# Patient Record
Sex: MALE | URBAN - METROPOLITAN AREA
[De-identification: names, ages, dates, MRNs, and addresses within clinical notes are randomized per-mention and may not be internally consistent; named-entity substitution may affect disease eponyms.]

---

## 2018-04-19 ENCOUNTER — APPOINTMENT (RX ONLY)
Age: 33
Setting detail: DERMATOLOGY
End: 2018-04-19

## 2018-04-19 DIAGNOSIS — L40.0 PSORIASIS VULGARIS: ICD-10-CM

## 2018-04-19 PROCEDURE — ? PATIENT SPECIFIC COUNSELING

## 2018-04-19 PROCEDURE — ? COUNSELING

## 2018-04-19 PROCEDURE — 99202 OFFICE O/P NEW SF 15 MIN: CPT

## 2018-04-19 PROCEDURE — ? PRESCRIPTION

## 2018-04-19 RX ORDER — PIMECROLIMUS 10 MG/G
CREAM TOPICAL
Qty: 1 | Refills: 5 | Status: ERX | COMMUNITY
Start: 2018-04-19

## 2018-04-19 RX ORDER — CALCIPOTRIENE AND BETAMETHASONE DIPROPIONATE 50; .5 UG/G; MG/G
SUSPENSION TOPICAL
Qty: 1 | Refills: 5 | Status: ERX | COMMUNITY
Start: 2018-04-19

## 2018-04-19 RX ADMIN — CALCIPOTRIENE AND BETAMETHASONE DIPROPIONATE: 50; .5 SUSPENSION TOPICAL at 20:05

## 2018-04-19 RX ADMIN — PIMECROLIMUS: 10 CREAM TOPICAL at 20:06

## 2018-04-19 ASSESSMENT — LOCATION SIMPLE DESCRIPTION DERM
LOCATION SIMPLE: LEFT FOREHEAD
LOCATION SIMPLE: ABDOMEN
LOCATION SIMPLE: LEFT KNEE
LOCATION SIMPLE: LOWER BACK
LOCATION SIMPLE: RIGHT KNEE

## 2018-04-19 ASSESSMENT — LOCATION ZONE DERM
LOCATION ZONE: TRUNK
LOCATION ZONE: LEG
LOCATION ZONE: FACE

## 2018-04-19 ASSESSMENT — BSA PSORIASIS: % BODY COVERED IN PSORIASIS: 16

## 2018-04-19 ASSESSMENT — LOCATION DETAILED DESCRIPTION DERM
LOCATION DETAILED: LEFT KNEE
LOCATION DETAILED: INFERIOR LUMBAR SPINE
LOCATION DETAILED: PERIUMBILICAL SKIN
LOCATION DETAILED: LEFT FOREHEAD
LOCATION DETAILED: RIGHT KNEE

## 2018-04-19 ASSESSMENT — PGA PSORIASIS: PGA PSORIASIS: MODERATE (MODERATE PLAQUE ELEVATION, MODERATE ERYTHEMA, COARSE SCALE PREDOMINATES)

## 2018-04-19 NOTE — PROCEDURE: PATIENT SPECIFIC COUNSELING
Eat more veggies! Exercise! Letter sent to PCP. \\nElidel is a non-steroidal, anti-inflammatory drug without the side effects of rebound and permanent skin-thinning to be used in the groin. \\nI recommended excimer laser treatment along with topicals and we will work on getting approval for him.
Detail Level: Simple

## 2018-05-15 ENCOUNTER — APPOINTMENT (RX ONLY)
Age: 33
Setting detail: DERMATOLOGY
End: 2018-05-15

## 2018-05-15 DIAGNOSIS — L40.0 PSORIASIS VULGARIS: ICD-10-CM

## 2018-05-15 PROCEDURE — 96922 EXCIMER LSR PSRIASIS>500SQCM: CPT

## 2018-05-15 PROCEDURE — ? XTRAC LASER

## 2018-05-15 ASSESSMENT — LOCATION DETAILED DESCRIPTION DERM: LOCATION DETAILED: RIGHT SUPERIOR MEDIAL UPPER BACK

## 2018-05-15 ASSESSMENT — LOCATION SIMPLE DESCRIPTION DERM: LOCATION SIMPLE: RIGHT UPPER BACK

## 2018-05-15 ASSESSMENT — LOCATION ZONE DERM: LOCATION ZONE: TRUNK

## 2018-05-15 NOTE — PROCEDURE: XTRAC LASER
Dose Setting #1 (Mj/Cm2): 500
Consent: see scanned.
Detail Level: Generalized
Post-Care Instructions: I reviewed with the patient in detail post-care instructions. Patient should stay away from the sun and wear sun protection until treated areas are fully healed.
Total Square Area In Cm2 (Required For Proper Billing): 571
Treatment Number: 1
Total Pulses (Will Not Render If 0): 0
Location #1: feet, legs, arms, back, scalp, hands
Total Energy In Joules: 286.00

## 2018-05-17 ENCOUNTER — APPOINTMENT (RX ONLY)
Age: 33
Setting detail: DERMATOLOGY
End: 2018-05-17

## 2018-05-17 DIAGNOSIS — L40.0 PSORIASIS VULGARIS: ICD-10-CM

## 2018-05-17 PROCEDURE — ? XTRAC LASER

## 2018-05-17 PROCEDURE — 96922 EXCIMER LSR PSRIASIS>500SQCM: CPT

## 2018-05-17 ASSESSMENT — LOCATION DETAILED DESCRIPTION DERM: LOCATION DETAILED: RIGHT SUPERIOR MEDIAL UPPER BACK

## 2018-05-17 ASSESSMENT — LOCATION ZONE DERM: LOCATION ZONE: TRUNK

## 2018-05-17 ASSESSMENT — LOCATION SIMPLE DESCRIPTION DERM: LOCATION SIMPLE: RIGHT UPPER BACK

## 2018-05-17 NOTE — PROCEDURE: XTRAC LASER
Location #1: feet, legs, arms, back, scalp, hands
Detail Level: Generalized
Total Square Area In Cm2 (Required For Proper Billing): 504
Consent: see scanned.
Post-Care Instructions: I reviewed with the patient in detail post-care instructions. Patient should stay away from the sun and wear sun protection until treated areas are fully healed.
Total Energy In Joules: 289.80
Dose Setting #1 (Mj/Cm2): 571
Total Pulses (Will Not Render If 0): 0
Treatment Number: 2

## 2018-05-22 ENCOUNTER — APPOINTMENT (RX ONLY)
Age: 33
Setting detail: DERMATOLOGY
End: 2018-05-22

## 2018-05-22 DIAGNOSIS — L40.0 PSORIASIS VULGARIS: ICD-10-CM

## 2018-05-22 PROCEDURE — ? XTRAC LASER

## 2018-05-22 PROCEDURE — 96922 EXCIMER LSR PSRIASIS>500SQCM: CPT

## 2018-05-22 ASSESSMENT — LOCATION SIMPLE DESCRIPTION DERM: LOCATION SIMPLE: RIGHT UPPER BACK

## 2018-05-22 ASSESSMENT — LOCATION DETAILED DESCRIPTION DERM: LOCATION DETAILED: RIGHT SUPERIOR MEDIAL UPPER BACK

## 2018-05-22 ASSESSMENT — LOCATION ZONE DERM: LOCATION ZONE: TRUNK

## 2018-05-22 NOTE — PROCEDURE: XTRAC LASER
Treatment Number: 3
Total Energy In Joules: 308.20
Location #1: feet, legs, arms, back, scalp, hands
Post-Care Instructions: I reviewed with the patient in detail post-care instructions. Patient should stay away from the sun and wear sun protection until treated areas are fully healed.
Total Square Area In Cm2 (Required For Proper Billing): 53
Total Pulses (Will Not Render If 0): 0
Consent: see scanned.
Detail Level: Generalized
Dose Setting #1 (Mj/Cm2): 576

## 2018-05-24 ENCOUNTER — APPOINTMENT (RX ONLY)
Age: 33
Setting detail: DERMATOLOGY
End: 2018-05-24

## 2018-05-24 DIAGNOSIS — L40.0 PSORIASIS VULGARIS: ICD-10-CM

## 2018-05-24 PROCEDURE — 96921 EXCIMER LSR PSRIASIS 250-500: CPT

## 2018-05-24 PROCEDURE — ? XTRAC LASER

## 2018-05-24 ASSESSMENT — LOCATION ZONE DERM: LOCATION ZONE: TRUNK

## 2018-05-24 ASSESSMENT — LOCATION SIMPLE DESCRIPTION DERM: LOCATION SIMPLE: RIGHT UPPER BACK

## 2018-05-24 ASSESSMENT — LOCATION DETAILED DESCRIPTION DERM: LOCATION DETAILED: RIGHT SUPERIOR MEDIAL UPPER BACK

## 2018-05-24 NOTE — PROCEDURE: XTRAC LASER
Location #3: scalp
Dose Setting #2 (Mj/Cm2): 577
Treatment Number: 4
Dose Setting #3 (Mj/Cm2): 690
Location #1: feet, legs, arms, back, hands
Post-Care Instructions: I reviewed with the patient in detail post-care instructions. Patient should stay away from the sun and wear sun protection until treated areas are fully healed.
Consent: see scanned.
Location #2: chest, axillae
Total Energy In Joules: 321.48
Detail Level: Generalized
Total Square Area In Cm2 (Required For Proper Billing): 480
Dose Setting #1 (Mj/Cm2): 660
Total Pulses (Will Not Render If 0): 0

## 2018-05-29 ENCOUNTER — APPOINTMENT (RX ONLY)
Age: 33
Setting detail: DERMATOLOGY
End: 2018-05-29

## 2018-05-29 DIAGNOSIS — L40.0 PSORIASIS VULGARIS: ICD-10-CM

## 2018-05-29 PROCEDURE — 96921 EXCIMER LSR PSRIASIS 250-500: CPT

## 2018-05-29 PROCEDURE — ? XTRAC LASER

## 2018-05-29 ASSESSMENT — LOCATION SIMPLE DESCRIPTION DERM: LOCATION SIMPLE: RIGHT UPPER BACK

## 2018-05-29 ASSESSMENT — LOCATION DETAILED DESCRIPTION DERM: LOCATION DETAILED: RIGHT SUPERIOR MEDIAL UPPER BACK

## 2018-05-29 ASSESSMENT — LOCATION ZONE DERM: LOCATION ZONE: TRUNK

## 2018-05-29 NOTE — PROCEDURE: XTRAC LASER
Dose Setting #1 (Mj/Cm2): 378
Dose Setting #3 (Mj/Cm2): 790
Total Square Area In Cm2 (Required For Proper Billing): 061
Location #1: feet, legs, arms, back, hands
Total Pulses (Will Not Render If 0): 0
Treatment Number: 5
Consent: see scanned.
Location #2: chest, axillae
Dose Setting #2 (Mj/Cm2): 57
Total Energy In Joules: 332.06
Post-Care Instructions: I reviewed with the patient in detail post-care instructions. Patient should stay away from the sun and wear sun protection until treated areas are fully healed.
Location #3: scalp
Detail Level: Generalized

## 2018-06-12 ENCOUNTER — APPOINTMENT (RX ONLY)
Age: 33
Setting detail: DERMATOLOGY
End: 2018-06-12

## 2018-06-12 DIAGNOSIS — L40.0 PSORIASIS VULGARIS: ICD-10-CM

## 2018-06-12 PROCEDURE — ? XTRAC LASER

## 2018-06-12 PROCEDURE — 96921 EXCIMER LSR PSRIASIS 250-500: CPT

## 2018-06-12 ASSESSMENT — LOCATION ZONE DERM: LOCATION ZONE: TRUNK

## 2018-06-12 ASSESSMENT — LOCATION SIMPLE DESCRIPTION DERM: LOCATION SIMPLE: RIGHT UPPER BACK

## 2018-06-12 ASSESSMENT — LOCATION DETAILED DESCRIPTION DERM: LOCATION DETAILED: RIGHT SUPERIOR MEDIAL UPPER BACK

## 2018-08-01 ENCOUNTER — APPOINTMENT (RX ONLY)
Age: 33
Setting detail: DERMATOLOGY
End: 2018-08-01

## 2018-08-01 DIAGNOSIS — L40.0 PSORIASIS VULGARIS: ICD-10-CM

## 2018-08-01 PROCEDURE — ? XTRAC LASER

## 2018-08-01 PROCEDURE — 96922 EXCIMER LSR PSRIASIS>500SQCM: CPT

## 2018-08-01 ASSESSMENT — LOCATION ZONE DERM: LOCATION ZONE: TRUNK

## 2018-08-01 ASSESSMENT — LOCATION SIMPLE DESCRIPTION DERM: LOCATION SIMPLE: RIGHT UPPER BACK

## 2018-08-01 ASSESSMENT — LOCATION DETAILED DESCRIPTION DERM: LOCATION DETAILED: RIGHT SUPERIOR MEDIAL UPPER BACK

## 2018-08-01 NOTE — PROCEDURE: XTRAC LASER
Dose Setting #2 (Mj/Cm2): 0
Post-Care Instructions: I reviewed with the patient in detail post-care instructions. Patient should stay away from the sun and wear sun protection until treated areas are fully healed.
Location #3: scalp
Treatment Number: 7
Total Square Area In Cm2 (Required For Proper Billing): 540
Dose Setting #1 (Mj/Cm2): 036
Detail Level: Generalized
Location #2: axillae
Consent: see scanned.
Dose Setting #3 (Mj/Cm2): 79
Total Energy In Joules: 420.30
Location #1: feet, legs, arms, back, hands, buttocks, toenails, nails, chest

## 2018-08-07 ENCOUNTER — APPOINTMENT (RX ONLY)
Age: 33
Setting detail: DERMATOLOGY
End: 2018-08-07

## 2018-08-07 DIAGNOSIS — L40.0 PSORIASIS VULGARIS: ICD-10-CM

## 2018-08-07 PROCEDURE — ? XTRAC LASER

## 2018-08-07 PROCEDURE — 96922 EXCIMER LSR PSRIASIS>500SQCM: CPT

## 2018-08-07 ASSESSMENT — LOCATION SIMPLE DESCRIPTION DERM: LOCATION SIMPLE: RIGHT UPPER BACK

## 2018-08-07 ASSESSMENT — LOCATION ZONE DERM: LOCATION ZONE: TRUNK

## 2018-08-07 ASSESSMENT — LOCATION DETAILED DESCRIPTION DERM: LOCATION DETAILED: RIGHT SUPERIOR MEDIAL UPPER BACK

## 2018-08-07 NOTE — PROCEDURE: XTRAC LASER
Total Pulses (Will Not Render If 0): 0
Post-Care Instructions: I reviewed with the patient in detail post-care instructions. Patient should stay away from the sun and wear sun protection until treated areas are fully healed.
Location #3: scalp
Total Square Area In Cm2 (Required For Proper Billing): 548
Consent: see scanned.
Dose Setting #3 (Mj/Cm2): 917
Treatment Number: 8
Location #2: axillae
Dose Setting #1 (Mj/Cm2): 760
Total Energy In Joules: 441.22
Location #1: feet, legs, arms, back, hands, buttocks, toenails, nails, chest
Detail Level: Generalized

## 2018-08-10 ENCOUNTER — APPOINTMENT (RX ONLY)
Age: 33
Setting detail: DERMATOLOGY
End: 2018-08-10

## 2018-08-10 DIAGNOSIS — L40.0 PSORIASIS VULGARIS: ICD-10-CM

## 2018-08-10 PROCEDURE — ? XTRAC LASER

## 2018-08-10 PROCEDURE — 96921 EXCIMER LSR PSRIASIS 250-500: CPT

## 2018-08-10 ASSESSMENT — LOCATION SIMPLE DESCRIPTION DERM: LOCATION SIMPLE: RIGHT UPPER BACK

## 2018-08-10 ASSESSMENT — LOCATION DETAILED DESCRIPTION DERM: LOCATION DETAILED: RIGHT SUPERIOR MEDIAL UPPER BACK

## 2018-08-10 ASSESSMENT — LOCATION ZONE DERM: LOCATION ZONE: TRUNK

## 2018-08-10 NOTE — PROCEDURE: XTRAC LASER
Dose Setting #1 (Mj/Cm2): 760
Detail Level: Generalized
Total Square Area In Cm2 (Required For Proper Billing): 416
Treatment Number: 9
Location #2: axillae
Consent: see scanned.
Total Energy In Joules: 316.16
Location #1: feet, legs, arms, back, hands, buttocks, toenails, nails, chest
Total Pulses (Will Not Render If 0): 0
Post-Care Instructions: I reviewed with the patient in detail post-care instructions. Patient should stay away from the sun and wear sun protection until treated areas are fully healed.
Location #3: scalp

## 2018-08-28 ENCOUNTER — APPOINTMENT (RX ONLY)
Age: 33
Setting detail: DERMATOLOGY
End: 2018-08-28

## 2018-08-28 ENCOUNTER — RX ONLY (OUTPATIENT)
Age: 33
Setting detail: RX ONLY
End: 2018-08-28

## 2018-08-28 DIAGNOSIS — L40.0 PSORIASIS VULGARIS: ICD-10-CM

## 2018-08-28 PROCEDURE — ? XTRAC LASER

## 2018-08-28 PROCEDURE — 96922 EXCIMER LSR PSRIASIS>500SQCM: CPT

## 2018-08-28 RX ORDER — CALCIPOTRIENE AND BETAMETHASONE DIPROPIONATE 50; .5 UG/G; MG/G
SUSPENSION TOPICAL
Qty: 1 | Refills: 5 | Status: ERX

## 2018-08-28 RX ORDER — DESONIDE 0.5 MG/G
CREAM TOPICAL
Qty: 1 | Refills: 2 | Status: ERX | COMMUNITY
Start: 2018-08-28

## 2018-08-28 ASSESSMENT — LOCATION SIMPLE DESCRIPTION DERM: LOCATION SIMPLE: RIGHT UPPER BACK

## 2018-08-28 ASSESSMENT — LOCATION ZONE DERM: LOCATION ZONE: TRUNK

## 2018-08-28 ASSESSMENT — LOCATION DETAILED DESCRIPTION DERM: LOCATION DETAILED: RIGHT SUPERIOR MEDIAL UPPER BACK

## 2018-08-28 NOTE — PROCEDURE: XTRAC LASER
Post-Care Instructions: I reviewed with the patient in detail post-care instructions. Patient should stay away from the sun and wear sun protection until treated areas are fully healed.
Dose Settinge #4 (Mj/Cm2): 900
Total Energy In Joules: 474.11
Location #1: feet, legs, arms, back, hands, buttocks, toenails, nails, chest
Location #4: nails, legs, gluteus
Location #2: axillae
Treatment Number: 10
Total Square Area In Cm2 (Required For Proper Billing): 573
Dose Setting #2 (Mj/Cm2): 500
Dose Setting #3 (Mj/Cm2): 760
Total Pulses (Will Not Render If 0): 0
Consent: see scanned.
Location #3: scalp
Detail Level: Generalized

## 2018-08-30 ENCOUNTER — APPOINTMENT (RX ONLY)
Age: 33
Setting detail: DERMATOLOGY
End: 2018-08-30

## 2018-08-30 DIAGNOSIS — L40.0 PSORIASIS VULGARIS: ICD-10-CM

## 2018-08-30 PROCEDURE — 96922 EXCIMER LSR PSRIASIS>500SQCM: CPT

## 2018-08-30 PROCEDURE — ? XTRAC LASER

## 2018-08-30 ASSESSMENT — LOCATION SIMPLE DESCRIPTION DERM: LOCATION SIMPLE: RIGHT UPPER BACK

## 2018-08-30 ASSESSMENT — LOCATION DETAILED DESCRIPTION DERM: LOCATION DETAILED: RIGHT SUPERIOR MEDIAL UPPER BACK

## 2018-08-30 ASSESSMENT — LOCATION ZONE DERM: LOCATION ZONE: TRUNK

## 2018-08-30 NOTE — PROCEDURE: XTRAC LASER
Location #2: axillae
Detail Level: Generalized
Total Pulses (Will Not Render If 0): 0
Treatment Number: 11
Dose Setting #2 (Mj/Cm2): 500
Dose Setting #3 (Mj/Cm2): 1005
Consent: see scanned.
Post-Care Instructions: I reviewed with the patient in detail post-care instructions. Patient should stay away from the sun and wear sun protection until treated areas are fully healed.
Location #1: chest, scalp, back
Total Energy In Joules: 471.93
Dose Setting #1 (Mj/Cm2): 632
Total Square Area In Cm2 (Required For Proper Billing): 000
Location #3: legs, gluteal cleft, nails

## 2018-09-06 ENCOUNTER — APPOINTMENT (RX ONLY)
Age: 33
Setting detail: DERMATOLOGY
End: 2018-09-06

## 2018-09-06 DIAGNOSIS — L40.0 PSORIASIS VULGARIS: ICD-10-CM

## 2018-09-06 PROCEDURE — ? XTRAC LASER

## 2018-09-06 PROCEDURE — 96921 EXCIMER LSR PSRIASIS 250-500: CPT

## 2018-09-06 ASSESSMENT — LOCATION DETAILED DESCRIPTION DERM: LOCATION DETAILED: RIGHT SUPERIOR MEDIAL UPPER BACK

## 2018-09-06 ASSESSMENT — LOCATION SIMPLE DESCRIPTION DERM: LOCATION SIMPLE: RIGHT UPPER BACK

## 2018-09-06 ASSESSMENT — LOCATION ZONE DERM: LOCATION ZONE: TRUNK

## 2018-09-06 NOTE — PROCEDURE: XTRAC LASER
Location #2: axillae
Dose Setting #3 (Mj/Cm2): 1005
Consent: see scanned.
Location #3: legs, gluteal cleft, nails
Post-Care Instructions: I reviewed with the patient in detail post-care instructions. Patient should stay away from the sun and wear sun protection until treated areas are fully healed.
Dose Setting #2 (Mj/Cm2): 500
Treatment Number: 12
Dose Setting #1 (Mj/Cm2): 630
Total Square Area In Cm2 (Required For Proper Billing): 486
Total Pulses (Will Not Render If 0): 0
Detail Level: Generalized
Total Energy In Joules: 403.99
Location #1: chest, scalp, back

## 2018-09-11 ENCOUNTER — APPOINTMENT (RX ONLY)
Age: 33
Setting detail: DERMATOLOGY
End: 2018-09-11

## 2018-09-11 DIAGNOSIS — L40.0 PSORIASIS VULGARIS: ICD-10-CM

## 2018-09-11 PROCEDURE — 96922 EXCIMER LSR PSRIASIS>500SQCM: CPT

## 2018-09-11 PROCEDURE — ? XTRAC LASER

## 2018-09-11 ASSESSMENT — LOCATION DETAILED DESCRIPTION DERM: LOCATION DETAILED: RIGHT SUPERIOR MEDIAL UPPER BACK

## 2018-09-11 ASSESSMENT — LOCATION SIMPLE DESCRIPTION DERM: LOCATION SIMPLE: RIGHT UPPER BACK

## 2018-09-11 ASSESSMENT — LOCATION ZONE DERM: LOCATION ZONE: TRUNK

## 2018-09-11 NOTE — PROCEDURE: XTRAC LASER
Location #1: chest, scalp, back
Total Square Area In Cm2 (Required For Proper Billing): 570
Consent: see scanned.
Dose Setting #1 (Mj/Cm2): 732
Location #3: nails, knees
Detail Level: Generalized
Dose Setting #2 (Mj/Cm2): 350
Treatment Number: 13
Total Energy In Joules: 439.27
Location #2: axillae, thighs
Dose Setting #3 (Mj/Cm2): 1005
Post-Care Instructions: I reviewed with the patient in detail post-care instructions. Patient should stay away from the sun and wear sun protection until treated areas are fully healed.
Total Pulses (Will Not Render If 0): 0

## 2018-09-17 ENCOUNTER — APPOINTMENT (RX ONLY)
Age: 33
Setting detail: DERMATOLOGY
End: 2018-09-17

## 2018-09-17 DIAGNOSIS — L40.0 PSORIASIS VULGARIS: ICD-10-CM

## 2018-09-17 PROCEDURE — ? XTRAC LASER

## 2018-09-17 PROCEDURE — 96922 EXCIMER LSR PSRIASIS>500SQCM: CPT

## 2018-09-17 ASSESSMENT — LOCATION SIMPLE DESCRIPTION DERM: LOCATION SIMPLE: RIGHT UPPER BACK

## 2018-09-17 ASSESSMENT — LOCATION ZONE DERM: LOCATION ZONE: TRUNK

## 2018-09-17 ASSESSMENT — LOCATION DETAILED DESCRIPTION DERM: LOCATION DETAILED: RIGHT SUPERIOR MEDIAL UPPER BACK

## 2018-09-17 NOTE — PROCEDURE: XTRAC LASER
Location #1: chest, scalp, back
Treatment Number: 14
Location #3: nails, toenails
Total Energy In Joules: 412.65
Detail Level: Generalized
Location #2: axillae, thighs
Total Square Area In Cm2 (Required For Proper Billing): 580
Total Pulses (Will Not Render If 0): 0
Dose Setting #3 (Mj/Cm2): 3966
Dose Setting #1 (Mj/Cm2): 732
Consent: see scanned.
Dose Setting #2 (Mj/Cm2): 350
Post-Care Instructions: I reviewed with the patient in detail post-care instructions. Patient should stay away from the sun and wear sun protection until treated areas are fully healed.

## 2018-09-25 ENCOUNTER — APPOINTMENT (RX ONLY)
Age: 33
Setting detail: DERMATOLOGY
End: 2018-09-25

## 2018-09-25 DIAGNOSIS — L40.0 PSORIASIS VULGARIS: ICD-10-CM

## 2018-09-25 PROCEDURE — 96922 EXCIMER LSR PSRIASIS>500SQCM: CPT

## 2018-09-25 PROCEDURE — ? XTRAC LASER

## 2018-09-25 ASSESSMENT — LOCATION SIMPLE DESCRIPTION DERM: LOCATION SIMPLE: RIGHT UPPER BACK

## 2018-09-25 ASSESSMENT — LOCATION ZONE DERM: LOCATION ZONE: TRUNK

## 2018-09-25 ASSESSMENT — LOCATION DETAILED DESCRIPTION DERM: LOCATION DETAILED: RIGHT SUPERIOR MEDIAL UPPER BACK

## 2018-09-25 NOTE — PROCEDURE: XTRAC LASER
Location #3: nails, toenails
Dose Setting #3 (Mj/Cm2): 3574
Treatment Number: 15
Detail Level: Generalized
Location #2: axillae, thighs
Post-Care Instructions: I reviewed with the patient in detail post-care instructions. Patient should stay away from the sun and wear sun protection until treated areas are fully healed.
Total Pulses (Will Not Render If 0): 0
Dose Setting #1 (Mj/Cm2): 732
Total Energy In Joules: 529.14
Total Square Area In Cm2 (Required For Proper Billing): 216
Location #1: chest, scalp, back
Consent: see scanned.
Dose Setting #2 (Mj/Cm2): 350

## 2018-09-27 ENCOUNTER — APPOINTMENT (RX ONLY)
Age: 33
Setting detail: DERMATOLOGY
End: 2018-09-27

## 2018-09-27 ENCOUNTER — RX ONLY (OUTPATIENT)
Age: 33
Setting detail: RX ONLY
End: 2018-09-27

## 2018-09-27 DIAGNOSIS — L40.0 PSORIASIS VULGARIS: ICD-10-CM

## 2018-09-27 PROCEDURE — ? XTRAC LASER

## 2018-09-27 PROCEDURE — 96922 EXCIMER LSR PSRIASIS>500SQCM: CPT

## 2018-09-27 RX ORDER — CLOBETASOL PROPIONATE 0.5 MG/G
OINTMENT TOPICAL
Qty: 1 | Refills: 2 | Status: ERX | COMMUNITY
Start: 2018-09-27

## 2018-09-27 ASSESSMENT — LOCATION ZONE DERM: LOCATION ZONE: TRUNK

## 2018-09-27 ASSESSMENT — LOCATION DETAILED DESCRIPTION DERM: LOCATION DETAILED: RIGHT SUPERIOR MEDIAL UPPER BACK

## 2018-09-27 ASSESSMENT — LOCATION SIMPLE DESCRIPTION DERM: LOCATION SIMPLE: RIGHT UPPER BACK

## 2018-09-27 NOTE — PROCEDURE: XTRAC LASER
Consent: see scanned.
Dose Setting #3 (Mj/Cm2): 2892
Dose Setting #1 (Mj/Cm2): 732
Detail Level: Generalized
Dose Setting #2 (Mj/Cm2): 350
Location #1: chest, scalp, back
Location #3: nails, toenails
Location #2: axillae, thighs
Total Square Area In Cm2 (Required For Proper Billing): 845
Treatment Number: 16
Post-Care Instructions: I reviewed with the patient in detail post-care instructions. Patient should stay away from the sun and wear sun protection until treated areas are fully healed.
Total Energy In Joules: 461.42
Total Pulses (Will Not Render If 0): 0

## 2018-10-02 ENCOUNTER — APPOINTMENT (RX ONLY)
Age: 33
Setting detail: DERMATOLOGY
End: 2018-10-02

## 2018-10-02 DIAGNOSIS — L40.0 PSORIASIS VULGARIS: ICD-10-CM

## 2018-10-02 PROCEDURE — ? XTRAC LASER

## 2018-10-02 PROCEDURE — 96922 EXCIMER LSR PSRIASIS>500SQCM: CPT

## 2018-10-02 ASSESSMENT — LOCATION DETAILED DESCRIPTION DERM: LOCATION DETAILED: RIGHT SUPERIOR MEDIAL UPPER BACK

## 2018-10-02 ASSESSMENT — LOCATION SIMPLE DESCRIPTION DERM: LOCATION SIMPLE: RIGHT UPPER BACK

## 2018-10-02 ASSESSMENT — LOCATION ZONE DERM: LOCATION ZONE: TRUNK

## 2018-10-02 NOTE — PROCEDURE: XTRAC LASER
Total Pulses (Will Not Render If 0): 0
Dose Setting #1 (Mj/Cm2): 805
Treatment Number: 17
Dose Setting #3 (Mj/Cm2): 5481
Location #2: axillae, thighs
Location #1: chest, scalp, back
Detail Level: Generalized
Dose Setting #2 (Mj/Cm2): 385
Total Square Area In Cm2 (Required For Proper Billing): 680
Consent: see scanned.
Total Energy In Joules: 559.64
Post-Care Instructions: I reviewed with the patient in detail post-care instructions. Patient should stay away from the sun and wear sun protection until treated areas are fully healed.
Location #3: nails, toenails

## 2018-10-04 ENCOUNTER — APPOINTMENT (RX ONLY)
Age: 33
Setting detail: DERMATOLOGY
End: 2018-10-04

## 2018-10-04 DIAGNOSIS — L40.0 PSORIASIS VULGARIS: ICD-10-CM

## 2018-10-04 PROCEDURE — 96922 EXCIMER LSR PSRIASIS>500SQCM: CPT

## 2018-10-04 PROCEDURE — ? XTRAC LASER

## 2018-10-04 ASSESSMENT — LOCATION DETAILED DESCRIPTION DERM: LOCATION DETAILED: RIGHT SUPERIOR MEDIAL UPPER BACK

## 2018-10-04 ASSESSMENT — LOCATION ZONE DERM: LOCATION ZONE: TRUNK

## 2018-10-04 ASSESSMENT — LOCATION SIMPLE DESCRIPTION DERM: LOCATION SIMPLE: RIGHT UPPER BACK

## 2018-10-04 NOTE — PROCEDURE: XTRAC LASER
Dose Setting #2 (Mj/Cm2): 385
Location #1: chest, scalp, back, knees
Total Square Area In Cm2 (Required For Proper Billing): 580
Detail Level: Generalized
Dose Setting #1 (Mj/Cm2): 805
Consent: see scanned.
Location #2: axillae, thighs
Post-Care Instructions: I reviewed with the patient in detail post-care instructions. Patient should stay away from the sun and wear sun protection until treated areas are fully healed.
Location #3: nails, toenails
Dose Setting #3 (Mj/Cm2): 7610
Treatment Number: 18
Total Energy In Joules: 486.50
Total Pulses (Will Not Render If 0): 0

## 2018-10-09 ENCOUNTER — APPOINTMENT (RX ONLY)
Age: 33
Setting detail: DERMATOLOGY
End: 2018-10-09

## 2018-10-09 DIAGNOSIS — L40.0 PSORIASIS VULGARIS: ICD-10-CM

## 2018-10-09 PROCEDURE — ? XTRAC LASER

## 2018-10-09 PROCEDURE — 96922 EXCIMER LSR PSRIASIS>500SQCM: CPT

## 2018-10-09 ASSESSMENT — LOCATION SIMPLE DESCRIPTION DERM: LOCATION SIMPLE: RIGHT UPPER BACK

## 2018-10-09 ASSESSMENT — LOCATION DETAILED DESCRIPTION DERM: LOCATION DETAILED: RIGHT SUPERIOR MEDIAL UPPER BACK

## 2018-10-09 ASSESSMENT — LOCATION ZONE DERM: LOCATION ZONE: TRUNK

## 2018-10-09 NOTE — PROCEDURE: XTRAC LASER
Detail Level: Generalized
Location #4: scalp
Total Energy In Joules: 569.60
Dose Setting #3 (Mj/Cm2): 0527
Location #1: chest, back, knees
Consent: see scanned.
Location #2: axillae, thighs
Total Pulses (Will Not Render If 0): 0
Post-Care Instructions: I reviewed with the patient in detail post-care instructions. Patient should stay away from the sun and wear sun protection until treated areas are fully healed.
Dose Settinge #4 (Mj/Cm2): 750
Dose Setting #2 (Mj/Cm2): 442
Treatment Number: 19
Total Square Area In Cm2 (Required For Proper Billing): 388
Location #3: nails, toenails
Dose Setting #1 (Mj/Cm2): 882

## 2018-10-15 ENCOUNTER — APPOINTMENT (RX ONLY)
Age: 33
Setting detail: DERMATOLOGY
End: 2018-10-15

## 2018-10-15 DIAGNOSIS — L40.0 PSORIASIS VULGARIS: ICD-10-CM

## 2018-10-15 PROCEDURE — ? XTRAC LASER

## 2018-10-15 PROCEDURE — 96922 EXCIMER LSR PSRIASIS>500SQCM: CPT

## 2018-10-15 ASSESSMENT — LOCATION SIMPLE DESCRIPTION DERM: LOCATION SIMPLE: RIGHT UPPER BACK

## 2018-10-15 ASSESSMENT — LOCATION ZONE DERM: LOCATION ZONE: TRUNK

## 2018-10-15 ASSESSMENT — LOCATION DETAILED DESCRIPTION DERM: LOCATION DETAILED: RIGHT SUPERIOR MEDIAL UPPER BACK

## 2018-10-15 NOTE — PROCEDURE: XTRAC LASER
Location #2: axillae, thighs
Total Energy In Joules: 569.60
Total Square Area In Cm2 (Required For Proper Billing): 518
Dose Settinge #4 (Mj/Cm2): 750
Detail Level: Generalized
Location #3: nails, toenails
Dose Setting #1 (Mj/Cm2): 1018
Post-Care Instructions: I reviewed with the patient in detail post-care instructions. Patient should stay away from the sun and wear sun protection until treated areas are fully healed.
Treatment Number: 20
Dose Setting #3 (Mj/Cm2): 1311
Location #4: scalp
Location #1: chest, back, knees
Consent: see scanned.
Dose Setting #2 (Mj/Cm2): 442
Total Pulses (Will Not Render If 0): 0

## 2018-10-18 ENCOUNTER — APPOINTMENT (RX ONLY)
Age: 33
Setting detail: DERMATOLOGY
End: 2018-10-18

## 2018-10-18 DIAGNOSIS — L40.0 PSORIASIS VULGARIS: ICD-10-CM

## 2018-10-18 PROCEDURE — ? XTRAC LASER

## 2018-10-18 PROCEDURE — 96922 EXCIMER LSR PSRIASIS>500SQCM: CPT

## 2018-10-18 ASSESSMENT — LOCATION DETAILED DESCRIPTION DERM: LOCATION DETAILED: RIGHT SUPERIOR MEDIAL UPPER BACK

## 2018-10-18 ASSESSMENT — LOCATION SIMPLE DESCRIPTION DERM: LOCATION SIMPLE: RIGHT UPPER BACK

## 2018-10-18 ASSESSMENT — LOCATION ZONE DERM: LOCATION ZONE: TRUNK

## 2018-10-18 NOTE — PROCEDURE: XTRAC LASER
Dose Settinge #4 (Mj/Cm2): 750
Treatment Number: 21
Location #2: axillae, thighs
Detail Level: Generalized
Location #3: nails, toenails
Total Square Area In Cm2 (Required For Proper Billing): 085
Total Energy In Joules: 713.75
Dose Setting #1 (Mj/Cm2): 1018
Consent: see scanned.
Dose Setting #2 (Mj/Cm2): 442
Location #1: chest, back, knees
Post-Care Instructions: I reviewed with the patient in detail post-care instructions. Patient should stay away from the sun and wear sun protection until treated areas are fully healed.
Dose Setting #3 (Mj/Cm2): 7581
Location #4: scalp
Total Pulses (Will Not Render If 0): 0

## 2018-10-23 ENCOUNTER — APPOINTMENT (RX ONLY)
Age: 33
Setting detail: DERMATOLOGY
End: 2018-10-23

## 2018-10-23 DIAGNOSIS — L40.0 PSORIASIS VULGARIS: ICD-10-CM

## 2018-10-23 PROCEDURE — ? XTRAC LASER

## 2018-10-23 PROCEDURE — 96922 EXCIMER LSR PSRIASIS>500SQCM: CPT

## 2018-10-23 ASSESSMENT — LOCATION ZONE DERM: LOCATION ZONE: TRUNK

## 2018-10-23 ASSESSMENT — LOCATION SIMPLE DESCRIPTION DERM: LOCATION SIMPLE: RIGHT UPPER BACK

## 2018-10-23 ASSESSMENT — LOCATION DETAILED DESCRIPTION DERM: LOCATION DETAILED: RIGHT SUPERIOR MEDIAL UPPER BACK

## 2018-10-23 NOTE — PROCEDURE: XTRAC LASER
Location #1: chest, back, knees
Dose Setting #3 (Mj/Cm2): 8508
Detail Level: Generalized
Location #4: scalp
Treatment Number: 22
Location #3: nails, toenails
Total Square Area In Cm2 (Required For Proper Billing): 504
Total Pulses (Will Not Render If 0): 0
Post-Care Instructions: I reviewed with the patient in detail post-care instructions. Patient should stay away from the sun and wear sun protection until treated areas are fully healed.
Consent: see scanned.
Dose Setting #1 (Mj/Cm2): 1018
Dose Settinge #4 (Mj/Cm2): 750
Location #2: axillae, thighs
Total Energy In Joules: 596
Dose Setting #2 (Mj/Cm2): 724

## 2018-10-26 ENCOUNTER — APPOINTMENT (RX ONLY)
Age: 33
Setting detail: DERMATOLOGY
End: 2018-10-26

## 2018-10-26 DIAGNOSIS — L40.0 PSORIASIS VULGARIS: ICD-10-CM

## 2018-10-26 PROCEDURE — 96922 EXCIMER LSR PSRIASIS>500SQCM: CPT

## 2018-10-26 PROCEDURE — ? XTRAC LASER

## 2018-10-26 ASSESSMENT — LOCATION SIMPLE DESCRIPTION DERM: LOCATION SIMPLE: RIGHT UPPER BACK

## 2018-10-26 ASSESSMENT — LOCATION ZONE DERM: LOCATION ZONE: TRUNK

## 2018-10-26 ASSESSMENT — LOCATION DETAILED DESCRIPTION DERM: LOCATION DETAILED: RIGHT SUPERIOR MEDIAL UPPER BACK

## 2018-10-26 NOTE — PROCEDURE: XTRAC LASER
Consent: see scanned.
Dose Setting #1 (Mj/Cm2): 782
Total Square Area In Cm2 (Required For Proper Billing): 680
Total Pulses (Will Not Render If 0): 0
Dose Setting #2 (Mj/Cm2): 112
Treatment Number: 23
Location #2: Knees
Total Energy In Joules: 669.65
Location #3: Axilae, thighs, buttocks
Post-Care Instructions: I reviewed with the patient in detail post-care instructions. Patient should stay away from the sun and wear sun protection until treated areas are fully healed.
Location #1: Scalp, trunk ,arms, legs
Detail Level: Generalized
Dose Setting #3 (Mj/Cm2): 118

## 2018-11-12 ENCOUNTER — APPOINTMENT (RX ONLY)
Age: 33
Setting detail: DERMATOLOGY
End: 2018-11-12

## 2018-11-12 DIAGNOSIS — L40.0 PSORIASIS VULGARIS: ICD-10-CM

## 2018-11-12 PROCEDURE — 96922 EXCIMER LSR PSRIASIS>500SQCM: CPT

## 2018-11-12 PROCEDURE — ? XTRAC LASER

## 2018-11-12 ASSESSMENT — LOCATION ZONE DERM: LOCATION ZONE: TRUNK

## 2018-11-12 ASSESSMENT — LOCATION DETAILED DESCRIPTION DERM: LOCATION DETAILED: RIGHT SUPERIOR MEDIAL UPPER BACK

## 2018-11-12 ASSESSMENT — LOCATION SIMPLE DESCRIPTION DERM: LOCATION SIMPLE: RIGHT UPPER BACK

## 2018-11-12 NOTE — PROCEDURE: XTRAC LASER
Post-Care Instructions: I reviewed with the patient in detail post-care instructions. Patient should stay away from the sun and wear sun protection until treated areas are fully healed.
Location #3: Axilae, thighs, buttocks
Location #4: nails
Dose Settinge #4 (Mj/Cm2): 3773
Total Square Area In Cm2 (Required For Proper Billing): 660
Dose Setting #1 (Mj/Cm2): 909
Dose Setting #3 (Mj/Cm2): 647
Consent: see scanned.
Total Pulses (Will Not Render If 0): 0
Location #1: Scalp, trunk ,arms, legs
% Increase/Decrease From Last Treatment: 15%
Total Energy In Joules: 758.38
Location #2: Knees
Treatment Number: 24
Dose Setting #2 (Mj/Cm2): 1729
Detail Level: Generalized

## 2018-11-16 ENCOUNTER — APPOINTMENT (RX ONLY)
Age: 33
Setting detail: DERMATOLOGY
End: 2018-11-16

## 2018-11-16 DIAGNOSIS — L40.0 PSORIASIS VULGARIS: ICD-10-CM

## 2018-11-16 PROCEDURE — 96922 EXCIMER LSR PSRIASIS>500SQCM: CPT

## 2018-11-16 PROCEDURE — ? XTRAC LASER

## 2018-11-16 ASSESSMENT — LOCATION DETAILED DESCRIPTION DERM: LOCATION DETAILED: RIGHT SUPERIOR MEDIAL UPPER BACK

## 2018-11-16 ASSESSMENT — LOCATION ZONE DERM: LOCATION ZONE: TRUNK

## 2018-11-16 ASSESSMENT — LOCATION SIMPLE DESCRIPTION DERM: LOCATION SIMPLE: RIGHT UPPER BACK

## 2018-11-16 NOTE — PROCEDURE: XTRAC LASER
Detail Level: Generalized
Location #4: nails
Dose Setting #1 (Mj/Cm2): 906
Post-Care Instructions: I reviewed with the patient in detail post-care instructions. Patient should stay away from the sun and wear sun protection until treated areas are fully healed.
Total Pulses (Will Not Render If 0): 0
Treatment Number: 25
Total Square Area In Cm2 (Required For Proper Billing): 580
Dose Setting #2 (Mj/Cm2): 4003
Consent: see scanned.
Location #1: Scalp, trunk ,arms, legs
Location #3: Axilae, thighs, buttocks
Location #2: Knees
Dose Settinge #4 (Mj/Cm2): 5240
Dose Setting #3 (Mj/Cm2): 734
Total Energy In Joules: 753.65
% Increase/Decrease From Last Treatment: 15%

## 2018-11-21 ENCOUNTER — APPOINTMENT (RX ONLY)
Age: 33
Setting detail: DERMATOLOGY
End: 2018-11-21

## 2018-11-21 DIAGNOSIS — L40.0 PSORIASIS VULGARIS: ICD-10-CM

## 2018-11-21 PROCEDURE — ? PRESCRIPTION

## 2018-11-21 PROCEDURE — ? COUNSELING

## 2018-11-21 PROCEDURE — 96922 EXCIMER LSR PSRIASIS>500SQCM: CPT

## 2018-11-21 PROCEDURE — ? XTRAC LASER

## 2018-11-21 PROCEDURE — 99213 OFFICE O/P EST LOW 20 MIN: CPT | Mod: 25

## 2018-11-21 RX ORDER — CLOBETASOL PROPIONATE 0.5 MG/ML
SOLUTION TOPICAL
Qty: 1 | Refills: 5 | Status: ERX | COMMUNITY
Start: 2018-11-21

## 2018-11-21 RX ORDER — CALCIPOTRIENE AND BETAMETHASONE DIPROPIONATE 50; .5 UG/G; MG/G
SUSPENSION TOPICAL
Qty: 2 | Refills: 5 | Status: ERX

## 2018-11-21 RX ORDER — CALCIPOTRIENE AND BETAMETHASONE DIPROPIONATE 50; .5 UG/G; MG/G
OINTMENT TOPICAL
Qty: 2 | Refills: 5 | Status: ERX | COMMUNITY
Start: 2018-11-21

## 2018-11-21 RX ORDER — CLOBETASOL PROPIONATE 0.5 MG/G
OINTMENT TOPICAL
Qty: 3 | Refills: 5 | Status: ERX | COMMUNITY
Start: 2018-11-21

## 2018-11-21 RX ADMIN — CLOBETASOL PROPIONATE: 0.5 SOLUTION TOPICAL at 22:16

## 2018-11-21 RX ADMIN — CLOBETASOL PROPIONATE: 0.5 OINTMENT TOPICAL at 22:11

## 2018-11-21 RX ADMIN — CALCIPOTRIENE AND BETAMETHASONE DIPROPIONATE: 50; .5 OINTMENT TOPICAL at 22:10

## 2018-11-21 ASSESSMENT — LOCATION SIMPLE DESCRIPTION DERM: LOCATION SIMPLE: RIGHT UPPER BACK

## 2018-11-21 ASSESSMENT — LOCATION ZONE DERM: LOCATION ZONE: TRUNK

## 2018-11-21 ASSESSMENT — LOCATION DETAILED DESCRIPTION DERM: LOCATION DETAILED: RIGHT SUPERIOR MEDIAL UPPER BACK

## 2018-11-21 NOTE — PROCEDURE: XTRAC LASER
Post-Care Instructions: I reviewed with the patient in detail post-care instructions. Patient should stay away from the sun and wear sun protection until treated areas are fully healed.
Consent: see scanned.
Location #2: Knees
Location #4: nails
Location #3: Axilae, thighs, buttocks, chest
Dose Settinge #4 (Mj/Cm2): 4711
Treatment Number: 26
Location #1: Scalp, back, arms
Total Square Area In Cm2 (Required For Proper Billing): 031
Dose Setting #1 (Mj/Cm2): 900
Dose Setting #2 (Mj/Cm2): 7067
Detail Level: Generalized
Dose Setting #3 (Mj/Cm2): 735
Total Pulses (Will Not Render If 0): 0
Total Energy In Joules: 851.75

## 2018-11-26 ENCOUNTER — APPOINTMENT (RX ONLY)
Age: 33
Setting detail: DERMATOLOGY
End: 2018-11-26

## 2018-11-26 DIAGNOSIS — L40.0 PSORIASIS VULGARIS: ICD-10-CM

## 2018-11-26 PROCEDURE — ? XTRAC LASER

## 2018-11-26 PROCEDURE — 96922 EXCIMER LSR PSRIASIS>500SQCM: CPT

## 2018-11-26 ASSESSMENT — LOCATION DETAILED DESCRIPTION DERM: LOCATION DETAILED: RIGHT SUPERIOR MEDIAL UPPER BACK

## 2018-11-26 ASSESSMENT — LOCATION SIMPLE DESCRIPTION DERM: LOCATION SIMPLE: RIGHT UPPER BACK

## 2018-11-26 ASSESSMENT — LOCATION ZONE DERM: LOCATION ZONE: TRUNK

## 2018-11-26 NOTE — PROCEDURE: XTRAC LASER
Dose Setting #3 (Mj/Cm2): 931
Detail Level: Generalized
Dose Setting #1 (Mj/Cm2): 907
Location #2: Knees
Location #1: Scalp, back, arms
Location #4: nails
Total Pulses (Will Not Render If 0): 0
Dose Setting #2 (Mj/Cm2): 0323
Consent: see scanned.
Dose Settinge #4 (Mj/Cm2): 7089
Treatment Number: 27
Post-Care Instructions: I reviewed with the patient in detail post-care instructions. Patient should stay away from the sun and wear sun protection until treated areas are fully healed.
Total Square Area In Cm2 (Required For Proper Billing): 680
Location #3: Axilae, thighs, buttocks, chest
Total Energy In Joules: 921.30

## 2018-11-29 ENCOUNTER — APPOINTMENT (RX ONLY)
Age: 33
Setting detail: DERMATOLOGY
End: 2018-11-29

## 2018-11-29 DIAGNOSIS — L40.0 PSORIASIS VULGARIS: ICD-10-CM

## 2018-11-29 PROCEDURE — 96922 EXCIMER LSR PSRIASIS>500SQCM: CPT

## 2018-11-29 PROCEDURE — ? XTRAC LASER

## 2018-11-29 ASSESSMENT — LOCATION ZONE DERM: LOCATION ZONE: TRUNK

## 2018-11-29 ASSESSMENT — LOCATION DETAILED DESCRIPTION DERM: LOCATION DETAILED: RIGHT SUPERIOR MEDIAL UPPER BACK

## 2018-11-29 ASSESSMENT — LOCATION SIMPLE DESCRIPTION DERM: LOCATION SIMPLE: RIGHT UPPER BACK

## 2018-11-29 NOTE — PROCEDURE: XTRAC LASER
Total Pulses (Will Not Render If 0): 0
Location #4: nails
Total Energy In Joules: 929.18
Dose Settinge #4 (Mj/Cm2): 0172
Dose Setting #2 (Mj/Cm2): 1964
Dose Setting #3 (Mj/Cm2): 939
Location #3: Axilae, thighs, buttocks, chest
Dose Setting #1 (Mj/Cm2): 902
Post-Care Instructions: I reviewed with the patient in detail post-care instructions. Patient should stay away from the sun and wear sun protection until treated areas are fully healed.
Consent: see scanned.
Detail Level: Generalized
Total Square Area In Cm2 (Required For Proper Billing): 144
Location #2: Knees
Treatment Number: 28
Location #1: Scalp, back, arms

## 2018-12-04 ENCOUNTER — APPOINTMENT (RX ONLY)
Age: 33
Setting detail: DERMATOLOGY
End: 2018-12-04

## 2018-12-04 DIAGNOSIS — L40.0 PSORIASIS VULGARIS: ICD-10-CM

## 2018-12-04 PROCEDURE — 96922 EXCIMER LSR PSRIASIS>500SQCM: CPT

## 2018-12-04 PROCEDURE — ? XTRAC LASER

## 2018-12-04 ASSESSMENT — LOCATION DETAILED DESCRIPTION DERM: LOCATION DETAILED: RIGHT SUPERIOR MEDIAL UPPER BACK

## 2018-12-04 ASSESSMENT — LOCATION SIMPLE DESCRIPTION DERM: LOCATION SIMPLE: RIGHT UPPER BACK

## 2018-12-04 ASSESSMENT — LOCATION ZONE DERM: LOCATION ZONE: TRUNK

## 2018-12-04 NOTE — PROCEDURE: XTRAC LASER
Location #4: nails
Detail Level: Generalized
Dose Setting #1 (Mj/Cm2): 900
Consent: see scanned.
Location #3: Axilae, thighs, buttocks, chest
Dose Setting #2 (Mj/Cm2): 1964
Treatment Number: 29
Total Energy In Joules: 1095.52
Post-Care Instructions: I reviewed with the patient in detail post-care instructions. Patient should stay away from the sun and wear sun protection until treated areas are fully healed.
Location #2: Knees
Total Pulses (Will Not Render If 0): 0
Dose Settinge #4 (Mj/Cm2): 3649
Location #1: Scalp, back, arms
Total Square Area In Cm2 (Required For Proper Billing): 800
Dose Setting #3 (Mj/Cm2): 938

## 2018-12-06 ENCOUNTER — APPOINTMENT (RX ONLY)
Age: 33
Setting detail: DERMATOLOGY
End: 2018-12-06

## 2018-12-06 DIAGNOSIS — L40.0 PSORIASIS VULGARIS: ICD-10-CM

## 2018-12-06 PROCEDURE — ? XTRAC LASER

## 2018-12-06 PROCEDURE — 96922 EXCIMER LSR PSRIASIS>500SQCM: CPT

## 2018-12-06 ASSESSMENT — LOCATION ZONE DERM: LOCATION ZONE: TRUNK

## 2018-12-06 ASSESSMENT — LOCATION SIMPLE DESCRIPTION DERM: LOCATION SIMPLE: RIGHT UPPER BACK

## 2018-12-06 ASSESSMENT — LOCATION DETAILED DESCRIPTION DERM: LOCATION DETAILED: RIGHT SUPERIOR MEDIAL UPPER BACK

## 2018-12-06 NOTE — PROCEDURE: XTRAC LASER
Dose Setting #3 (Mj/Cm2): 934
Location #1: Scalp, back, arms
Total Pulses (Will Not Render If 0): 0
Total Energy In Joules: 1033.14
Location #4: nails
Detail Level: Generalized
Location #2: Knees
Location #3: Axilae, thighs, buttocks, chest
Consent: see scanned.
Dose Setting #1 (Mj/Cm2): 903
Treatment Number: 30
Dose Settinge #4 (Mj/Cm2): 4813
Total Square Area In Cm2 (Required For Proper Billing): 769
Post-Care Instructions: I reviewed with the patient in detail post-care instructions. Patient should stay away from the sun and wear sun protection until treated areas are fully healed.
Dose Setting #2 (Mj/Cm2): 6826

## 2018-12-10 ENCOUNTER — APPOINTMENT (RX ONLY)
Age: 33
Setting detail: DERMATOLOGY
End: 2018-12-10

## 2018-12-10 DIAGNOSIS — L40.0 PSORIASIS VULGARIS: ICD-10-CM

## 2018-12-10 PROCEDURE — 96922 EXCIMER LSR PSRIASIS>500SQCM: CPT

## 2018-12-10 PROCEDURE — ? XTRAC LASER

## 2018-12-10 ASSESSMENT — LOCATION SIMPLE DESCRIPTION DERM: LOCATION SIMPLE: RIGHT UPPER BACK

## 2018-12-10 ASSESSMENT — LOCATION DETAILED DESCRIPTION DERM: LOCATION DETAILED: RIGHT SUPERIOR MEDIAL UPPER BACK

## 2018-12-10 ASSESSMENT — LOCATION ZONE DERM: LOCATION ZONE: TRUNK

## 2018-12-10 NOTE — PROCEDURE: XTRAC LASER
Location #2: Knees
Post-Care Instructions: I reviewed with the patient in detail post-care instructions. Patient should stay away from the sun and wear sun protection until treated areas are fully healed.
Treatment Number: 31
Dose Setting #3 (Mj/Cm2): 2316
Location #1: Scalp, back, arms
Location #3: Axilae, thighs, buttocks, chest
Total Energy In Joules: 1030.60
Total Square Area In Cm2 (Required For Proper Billing): 303
Dose Setting #1 (Mj/Cm2): 909
Location #4: nails
Total Pulses (Will Not Render If 0): 0
Consent: see scanned.
Detail Level: Generalized
Dose Setting #2 (Mj/Cm2): 0028
Dose Settinge #4 (Mj/Cm2): 6481

## 2018-12-11 ENCOUNTER — RX ONLY (OUTPATIENT)
Age: 33
Setting detail: RX ONLY
End: 2018-12-11

## 2018-12-11 RX ORDER — CALCIPOTRIENE AND BETAMETHASONE DIPROPIONATE 50; .5 UG/G; MG/G
OINTMENT TOPICAL
Qty: 2 | Refills: 5 | Status: ERX

## 2018-12-11 RX ORDER — CLOBETASOL PROPIONATE 0.5 MG/ML
SOLUTION TOPICAL
Qty: 1 | Refills: 5 | Status: ERX

## 2018-12-11 RX ORDER — CLOBETASOL PROPIONATE 0.5 MG/G
OINTMENT TOPICAL
Qty: 3 | Refills: 5 | Status: ERX

## 2018-12-11 RX ORDER — CALCIPOTRIENE AND BETAMETHASONE DIPROPIONATE 50; .5 UG/G; MG/G
SUSPENSION TOPICAL
Qty: 2 | Refills: 5 | Status: ERX

## 2018-12-13 ENCOUNTER — APPOINTMENT (RX ONLY)
Age: 33
Setting detail: DERMATOLOGY
End: 2018-12-13

## 2018-12-13 DIAGNOSIS — L40.0 PSORIASIS VULGARIS: ICD-10-CM

## 2018-12-13 PROCEDURE — 96922 EXCIMER LSR PSRIASIS>500SQCM: CPT

## 2018-12-13 PROCEDURE — ? XTRAC LASER

## 2018-12-13 ASSESSMENT — LOCATION DETAILED DESCRIPTION DERM: LOCATION DETAILED: RIGHT SUPERIOR MEDIAL UPPER BACK

## 2018-12-13 ASSESSMENT — LOCATION SIMPLE DESCRIPTION DERM: LOCATION SIMPLE: RIGHT UPPER BACK

## 2018-12-13 ASSESSMENT — LOCATION ZONE DERM: LOCATION ZONE: TRUNK

## 2018-12-13 NOTE — PROCEDURE: XTRAC LASER
Location #3: Axilae, thighs, buttocks
Total Pulses (Will Not Render If 0): 0
Dose Settinge #4 (Mj/Cm2): 5872
Location #4: nails
Dose Setting #2 (Mj/Cm2): 0816
Dose Setting #3 (Mj/Cm2): 4098
Treatment Number: 32
Consent: see scanned.
Location #2: Knees
Total Square Area In Cm2 (Required For Proper Billing): 471
Total Energy In Joules: 1143.73
Location #1: Scalp, back, arms, chest
Detail Level: Generalized
Post-Care Instructions: I reviewed with the patient in detail post-care instructions. Patient should stay away from the sun and wear sun protection until treated areas are fully healed.
Dose Setting #1 (Mj/Cm2): 900

## 2018-12-17 ENCOUNTER — APPOINTMENT (RX ONLY)
Age: 33
Setting detail: DERMATOLOGY
End: 2018-12-17

## 2018-12-17 DIAGNOSIS — L40.0 PSORIASIS VULGARIS: ICD-10-CM

## 2018-12-17 PROCEDURE — 96922 EXCIMER LSR PSRIASIS>500SQCM: CPT

## 2018-12-17 PROCEDURE — ? XTRAC LASER

## 2018-12-17 ASSESSMENT — LOCATION ZONE DERM: LOCATION ZONE: TRUNK

## 2018-12-17 ASSESSMENT — LOCATION DETAILED DESCRIPTION DERM: LOCATION DETAILED: RIGHT SUPERIOR MEDIAL UPPER BACK

## 2018-12-17 ASSESSMENT — LOCATION SIMPLE DESCRIPTION DERM: LOCATION SIMPLE: RIGHT UPPER BACK

## 2018-12-17 NOTE — PROCEDURE: XTRAC LASER
Treatment Number: 33
Total Square Area In Cm2 (Required For Proper Billing): 993
Consent: see scanned.
Location #3: Axilae, thighs, buttocks
Post-Care Instructions: I reviewed with the patient in detail post-care instructions. Patient should stay away from the sun and wear sun protection until treated areas are fully healed.
Dose Setting #1 (Mj/Cm2): 904
Dose Settinge #4 (Mj/Cm2): 6420
Dose Setting #3 (Mj/Cm2): 4883
Dose Setting #2 (Mj/Cm2): 7623
Total Pulses (Will Not Render If 0): 0
Total Energy In Joules: 1368.58
Detail Level: Generalized
Location #4: nails
Location #2: Knees
Location #1: Scalp, back, arms, chest

## 2018-12-19 ENCOUNTER — APPOINTMENT (RX ONLY)
Age: 33
Setting detail: DERMATOLOGY
End: 2018-12-19

## 2018-12-19 DIAGNOSIS — L40.0 PSORIASIS VULGARIS: ICD-10-CM

## 2018-12-19 PROCEDURE — 96922 EXCIMER LSR PSRIASIS>500SQCM: CPT

## 2018-12-19 PROCEDURE — ? XTRAC LASER

## 2018-12-19 ASSESSMENT — LOCATION ZONE DERM: LOCATION ZONE: TRUNK

## 2018-12-19 ASSESSMENT — LOCATION DETAILED DESCRIPTION DERM: LOCATION DETAILED: RIGHT SUPERIOR MEDIAL UPPER BACK

## 2018-12-19 ASSESSMENT — LOCATION SIMPLE DESCRIPTION DERM: LOCATION SIMPLE: RIGHT UPPER BACK

## 2018-12-19 NOTE — PROCEDURE: XTRAC LASER
Location #3: Axilae, thighs, buttocks
Dose Setting #1 (Mj/Cm2): 903
Detail Level: Generalized
Total Square Area In Cm2 (Required For Proper Billing): 711
Location #1: Scalp, back, arms, chest
Dose Settinge #4 (Mj/Cm2): 2029
Total Energy In Joules: 1090.45
Dose Setting #3 (Mj/Cm2): 0117
Post-Care Instructions: I reviewed with the patient in detail post-care instructions. Patient should stay away from the sun and wear sun protection until treated areas are fully healed.
Dose Setting #2 (Mj/Cm2): 7016
Location #2: Knees
Treatment Number: 34
Total Pulses (Will Not Render If 0): 0
Comments: Left scalp skipped due to resolving blisters.
Location #4: nails
Consent: see scanned.

## 2019-01-28 ENCOUNTER — RX ONLY (OUTPATIENT)
Age: 34
Setting detail: RX ONLY
End: 2019-01-28

## 2019-01-28 ENCOUNTER — APPOINTMENT (RX ONLY)
Age: 34
Setting detail: DERMATOLOGY
End: 2019-01-28

## 2019-01-28 DIAGNOSIS — L40.0 PSORIASIS VULGARIS: ICD-10-CM

## 2019-01-28 PROCEDURE — ? XTRAC LASER

## 2019-01-28 PROCEDURE — 96922 EXCIMER LSR PSRIASIS>500SQCM: CPT

## 2019-01-28 RX ORDER — CLOBETASOL PROPIONATE 0.5 MG/ML
SOLUTION TOPICAL
Qty: 1 | Refills: 5

## 2019-01-28 RX ORDER — CALCIPOTRIENE AND BETAMETHASONE DIPROPIONATE 50; .5 UG/G; MG/G
SUSPENSION TOPICAL
Qty: 1 | Refills: 5

## 2019-01-28 RX ORDER — CLOBETASOL PROPIONATE 0.5 MG/G
OINTMENT TOPICAL
Qty: 3 | Refills: 5

## 2019-01-28 RX ORDER — CALCIPOTRIENE AND BETAMETHASONE DIPROPIONATE 50; .5 UG/G; MG/G
OINTMENT TOPICAL
Qty: 1 | Refills: 5 | COMMUNITY
Start: 2019-01-28

## 2019-01-28 ASSESSMENT — LOCATION DETAILED DESCRIPTION DERM: LOCATION DETAILED: RIGHT SUPERIOR MEDIAL UPPER BACK

## 2019-01-28 ASSESSMENT — LOCATION ZONE DERM: LOCATION ZONE: TRUNK

## 2019-01-28 ASSESSMENT — LOCATION SIMPLE DESCRIPTION DERM: LOCATION SIMPLE: RIGHT UPPER BACK

## 2019-01-28 NOTE — PROCEDURE: XTRAC LASER
Dose Setting #1 (Mj/Cm2): 725
Treatment Number: 35
Comments: Left scalp skipped due to resolving blisters.
Total Pulses (Will Not Render If 0): 0
Dose Settinge #4 (Mj/Cm2): 6989
Dose Setting #2 (Mj/Cm2): 4022
Location #3: Axilae, thighs, buttocks
Detail Level: Generalized
Location #4: nails
Total Square Area In Cm2 (Required For Proper Billing): 764
Location #2: Knees
Location #1: Scalp, back, arms, chest
Post-Care Instructions: I reviewed with the patient in detail post-care instructions. Patient should stay away from the sun and wear sun protection until treated areas are fully healed.
Total Energy In Joules: 819.17
Consent: see scanned.

## 2019-01-30 ENCOUNTER — APPOINTMENT (RX ONLY)
Age: 34
Setting detail: DERMATOLOGY
End: 2019-01-30

## 2019-01-30 DIAGNOSIS — L40.0 PSORIASIS VULGARIS: ICD-10-CM

## 2019-01-30 PROCEDURE — 96922 EXCIMER LSR PSRIASIS>500SQCM: CPT

## 2019-01-30 PROCEDURE — ? XTRAC LASER

## 2019-01-30 ASSESSMENT — LOCATION SIMPLE DESCRIPTION DERM: LOCATION SIMPLE: RIGHT UPPER BACK

## 2019-01-30 ASSESSMENT — LOCATION DETAILED DESCRIPTION DERM: LOCATION DETAILED: RIGHT SUPERIOR MEDIAL UPPER BACK

## 2019-01-30 ASSESSMENT — LOCATION ZONE DERM: LOCATION ZONE: TRUNK

## 2019-01-30 NOTE — PROCEDURE: XTRAC LASER
Total Pulses (Will Not Render If 0): 0
Post-Care Instructions: I reviewed with the patient in detail post-care instructions. Patient should stay away from the sun and wear sun protection until treated areas are fully healed.
Comments: Left scalp skipped due to resolving blisters.
Total Square Area In Cm2 (Required For Proper Billing): 846
Dose Settinge #4 (Mj/Cm2): 0915
Location #1: Scalp, back, arms, chest
Dose Setting #3 (Mj/Cm2): 727
Location #4: nails
Total Energy In Joules: 808.18
Consent: see scanned.
Detail Level: Generalized
Treatment Number: 36
Location #3: Axilae, thighs, buttocks
Location #2: Knees
Dose Setting #2 (Mj/Cm2): 1298

## 2019-02-08 ENCOUNTER — APPOINTMENT (RX ONLY)
Age: 34
Setting detail: DERMATOLOGY
End: 2019-02-08

## 2019-02-08 DIAGNOSIS — L40.0 PSORIASIS VULGARIS: ICD-10-CM

## 2019-02-08 PROCEDURE — 96922 EXCIMER LSR PSRIASIS>500SQCM: CPT

## 2019-02-08 PROCEDURE — ? XTRAC LASER

## 2019-02-08 ASSESSMENT — LOCATION DETAILED DESCRIPTION DERM: LOCATION DETAILED: RIGHT SUPERIOR MEDIAL UPPER BACK

## 2019-02-08 ASSESSMENT — LOCATION SIMPLE DESCRIPTION DERM: LOCATION SIMPLE: RIGHT UPPER BACK

## 2019-02-08 ASSESSMENT — LOCATION ZONE DERM: LOCATION ZONE: TRUNK

## 2019-02-08 NOTE — PROCEDURE: XTRAC LASER
Detail Level: Generalized
Dose Setting #1 (Mj/Cm2): 725
Total Square Area In Cm2 (Required For Proper Billing): 720
Total Energy In Joules: 831.80
Post-Care Instructions: I reviewed with the patient in detail post-care instructions. Patient should stay away from the sun and wear sun protection until treated areas are fully healed.
Location #3: Axilae, thighs, buttocks
Total Pulses (Will Not Render If 0): 0
Location #1: Scalp, back, arms, chest
Dose Setting #2 (Mj/Cm2): 8921
Treatment Number: 37
Dose Settinge #4 (Mj/Cm2): 7354
Location #2: Knees
Comments: Left scalp skipped due to resolving blisters.
Location #4: nails
Consent: see scanned.

## 2019-02-18 ENCOUNTER — APPOINTMENT (RX ONLY)
Age: 34
Setting detail: DERMATOLOGY
End: 2019-02-18

## 2019-02-18 DIAGNOSIS — L40.0 PSORIASIS VULGARIS: ICD-10-CM

## 2019-02-18 PROCEDURE — 96922 EXCIMER LSR PSRIASIS>500SQCM: CPT

## 2019-02-18 PROCEDURE — ? XTRAC LASER

## 2019-02-18 ASSESSMENT — LOCATION SIMPLE DESCRIPTION DERM: LOCATION SIMPLE: RIGHT UPPER BACK

## 2019-02-18 ASSESSMENT — LOCATION ZONE DERM: LOCATION ZONE: TRUNK

## 2019-02-18 ASSESSMENT — LOCATION DETAILED DESCRIPTION DERM: LOCATION DETAILED: RIGHT SUPERIOR MEDIAL UPPER BACK

## 2019-02-18 NOTE — PROCEDURE: XTRAC LASER
Dose Settinge #4 (Mj/Cm2): 5027
Consent: see scanned.
Treatment Number: 38
Location #1: Scalp, back, arms, chest
Detail Level: Generalized
Comments: Left scalp skipped due to resolving blisters.
Total Energy In Joules: 863.30
Dose Setting #2 (Mj/Cm2): 8709
Location #4: nails
Total Square Area In Cm2 (Required For Proper Billing): 846
Total Pulses (Will Not Render If 0): 0
Location #3: Axilae, thighs, buttocks
Location #2: Knees
Dose Setting #3 (Mj/Cm2): 857
Post-Care Instructions: I reviewed with the patient in detail post-care instructions. Patient should stay away from the sun and wear sun protection until treated areas are fully healed.
Dose Setting #1 (Mj/Cm2): 829

## 2019-02-25 ENCOUNTER — APPOINTMENT (RX ONLY)
Age: 34
Setting detail: DERMATOLOGY
End: 2019-02-25

## 2019-02-25 DIAGNOSIS — L40.0 PSORIASIS VULGARIS: ICD-10-CM

## 2019-02-25 PROCEDURE — ? XTRAC LASER

## 2019-02-25 PROCEDURE — 96922 EXCIMER LSR PSRIASIS>500SQCM: CPT

## 2019-02-25 ASSESSMENT — LOCATION ZONE DERM: LOCATION ZONE: TRUNK

## 2019-02-25 ASSESSMENT — LOCATION SIMPLE DESCRIPTION DERM: LOCATION SIMPLE: RIGHT UPPER BACK

## 2019-02-25 ASSESSMENT — LOCATION DETAILED DESCRIPTION DERM: LOCATION DETAILED: RIGHT SUPERIOR MEDIAL UPPER BACK

## 2019-02-25 NOTE — PROCEDURE: XTRAC LASER
Location #1: Scalp, back, arms, chest
Dose Settinge #4 (Mj/Cm2): 5505
Location #3: Axilae, thighs, buttocks
Consent: see scanned.
Dose Setting #2 (Mj/Cm2): 1877
Detail Level: Generalized
Total Energy In Joules: 885.45
Location #2: Knees
Location #4: nails
Total Pulses (Will Not Render If 0): 0
Post-Care Instructions: I reviewed with the patient in detail post-care instructions. Patient should stay away from the sun and wear sun protection until treated areas are fully healed.
Treatment Number: 39
Comments: Left scalp skipped due to resolving blisters.
Total Square Area In Cm2 (Required For Proper Billing): 582
Dose Setting #3 (Mj/Cm2): 648
Dose Setting #1 (Mj/Cm2): 72

## 2019-02-28 ENCOUNTER — APPOINTMENT (RX ONLY)
Age: 34
Setting detail: DERMATOLOGY
End: 2019-02-28

## 2019-02-28 DIAGNOSIS — L40.0 PSORIASIS VULGARIS: ICD-10-CM

## 2019-02-28 PROCEDURE — ? XTRAC LASER

## 2019-02-28 PROCEDURE — 96922 EXCIMER LSR PSRIASIS>500SQCM: CPT

## 2019-02-28 ASSESSMENT — LOCATION SIMPLE DESCRIPTION DERM: LOCATION SIMPLE: RIGHT UPPER BACK

## 2019-02-28 ASSESSMENT — LOCATION ZONE DERM: LOCATION ZONE: TRUNK

## 2019-02-28 ASSESSMENT — LOCATION DETAILED DESCRIPTION DERM: LOCATION DETAILED: RIGHT SUPERIOR MEDIAL UPPER BACK

## 2019-02-28 NOTE — PROCEDURE: XTRAC LASER
Location #2: Knees
Detail Level: Generalized
Location #1: Scalp, back, arms, chest
Total Square Area In Cm2 (Required For Proper Billing): 680
Comments: Left scalp skipped due to resolving blisters.
Dose Settinge #4 (Mj/Cm2): 2851
Dose Setting #2 (Mj/Cm2): 9160
Location #4: nails
Dose Setting #1 (Mj/Cm2): 724
Post-Care Instructions: I reviewed with the patient in detail post-care instructions. Patient should stay away from the sun and wear sun protection until treated areas are fully healed.
Treatment Number: 40
Location #3: Axilae, thighs, buttocks
Consent: see scanned.
Dose Setting #3 (Mj/Cm2): 987
Total Energy In Joules: 837.74
Total Pulses (Will Not Render If 0): 0

## 2019-03-04 ENCOUNTER — APPOINTMENT (RX ONLY)
Age: 34
Setting detail: DERMATOLOGY
End: 2019-03-04

## 2019-03-04 DIAGNOSIS — L40.0 PSORIASIS VULGARIS: ICD-10-CM

## 2019-03-04 PROCEDURE — ? XTRAC LASER

## 2019-03-04 PROCEDURE — 96922 EXCIMER LSR PSRIASIS>500SQCM: CPT

## 2019-03-04 ASSESSMENT — LOCATION SIMPLE DESCRIPTION DERM: LOCATION SIMPLE: RIGHT UPPER BACK

## 2019-03-04 ASSESSMENT — LOCATION ZONE DERM: LOCATION ZONE: TRUNK

## 2019-03-04 ASSESSMENT — LOCATION DETAILED DESCRIPTION DERM: LOCATION DETAILED: RIGHT SUPERIOR MEDIAL UPPER BACK

## 2019-03-04 NOTE — PROCEDURE: XTRAC LASER
Dose Setting #2 (Mj/Cm2): 3788
Dose Setting #3 (Mj/Cm2): 987
Comments: Left scalp skipped due to resolving blisters.
Location #2: Knees
Detail Level: Generalized
Treatment Number: 41
Dose Settinge #4 (Mj/Cm2): 9718
Total Energy In Joules: 900.86
Location #4: nails
Consent: see scanned.
Location #1: Scalp, back, arms, chest
Total Square Area In Cm2 (Required For Proper Billing): 352
Location #3: Axilae, thighs, buttocks
Post-Care Instructions: I reviewed with the patient in detail post-care instructions. Patient should stay away from the sun and wear sun protection until treated areas are fully healed.
Dose Setting #1 (Mj/Cm2): 800
Total Pulses (Will Not Render If 0): 0

## 2019-04-01 ENCOUNTER — APPOINTMENT (RX ONLY)
Age: 34
Setting detail: DERMATOLOGY
End: 2019-04-01

## 2019-04-01 DIAGNOSIS — L40.0 PSORIASIS VULGARIS: ICD-10-CM

## 2019-04-01 PROCEDURE — ? XTRAC LASER

## 2019-04-01 PROCEDURE — 96922 EXCIMER LSR PSRIASIS>500SQCM: CPT

## 2019-04-01 ASSESSMENT — LOCATION SIMPLE DESCRIPTION DERM: LOCATION SIMPLE: RIGHT UPPER BACK

## 2019-04-01 ASSESSMENT — LOCATION ZONE DERM: LOCATION ZONE: TRUNK

## 2019-04-01 ASSESSMENT — LOCATION DETAILED DESCRIPTION DERM: LOCATION DETAILED: RIGHT SUPERIOR MEDIAL UPPER BACK

## 2019-04-01 NOTE — PROCEDURE: XTRAC LASER
Location #2: Knees
Total Square Area In Cm2 (Required For Proper Billing): 600
Consent: see scanned.
Location #3: Axilae, thighs, buttocks
Detail Level: Generalized
Dose Setting #2 (Mj/Cm2): 8971
Treatment Number: 42
Dose Setting #3 (Mj/Cm2): 987
Location #1: Scalp, back, arms, chest
Comments: Left scalp skipped due to resolving blisters.
Dose Settinge #4 (Mj/Cm2): 3000
Total Pulses (Will Not Render If 0): 0
Dose Setting #1 (Mj/Cm2): 801
Location #4: nails
Post-Care Instructions: I reviewed with the patient in detail post-care instructions. Patient should stay away from the sun and wear sun protection until treated areas are fully healed.
Total Energy In Joules: 788.26

## 2019-04-04 ENCOUNTER — APPOINTMENT (RX ONLY)
Age: 34
Setting detail: DERMATOLOGY
End: 2019-04-04

## 2019-04-04 DIAGNOSIS — L40.0 PSORIASIS VULGARIS: ICD-10-CM

## 2019-04-04 PROCEDURE — 96922 EXCIMER LSR PSRIASIS>500SQCM: CPT

## 2019-04-04 PROCEDURE — ? XTRAC LASER

## 2019-04-04 ASSESSMENT — LOCATION DETAILED DESCRIPTION DERM: LOCATION DETAILED: RIGHT SUPERIOR MEDIAL UPPER BACK

## 2019-04-04 ASSESSMENT — LOCATION SIMPLE DESCRIPTION DERM: LOCATION SIMPLE: RIGHT UPPER BACK

## 2019-04-04 ASSESSMENT — LOCATION ZONE DERM: LOCATION ZONE: TRUNK

## 2019-04-04 NOTE — PROCEDURE: XTRAC LASER
Location #1: Scalp, back, arms, chest
Location #4: nails
Consent: see scanned.
Location #2: Knees
Total Pulses (Will Not Render If 0): 0
Total Square Area In Cm2 (Required For Proper Billing): 936
Dose Settinge #4 (Mj/Cm2): 3000
Detail Level: Generalized
Dose Setting #3 (Mj/Cm2): 987
Location #3: Axilae, thighs, buttocks
Total Energy In Joules: 837.35
Comments: Left scalp skipped due to resolving blisters.
Treatment Number: 43
Dose Setting #2 (Mj/Cm2): 6943
Dose Setting #1 (Mj/Cm2): 939
Post-Care Instructions: I reviewed with the patient in detail post-care instructions. Patient should stay away from the sun and wear sun protection until treated areas are fully healed.

## 2019-04-10 ENCOUNTER — APPOINTMENT (RX ONLY)
Age: 34
Setting detail: DERMATOLOGY
End: 2019-04-10

## 2019-04-10 DIAGNOSIS — L40.0 PSORIASIS VULGARIS: ICD-10-CM

## 2019-04-10 PROCEDURE — ? XTRAC LASER

## 2019-04-10 PROCEDURE — 96922 EXCIMER LSR PSRIASIS>500SQCM: CPT

## 2019-04-10 ASSESSMENT — LOCATION SIMPLE DESCRIPTION DERM: LOCATION SIMPLE: RIGHT UPPER BACK

## 2019-04-10 ASSESSMENT — LOCATION DETAILED DESCRIPTION DERM: LOCATION DETAILED: RIGHT SUPERIOR MEDIAL UPPER BACK

## 2019-04-10 ASSESSMENT — LOCATION ZONE DERM: LOCATION ZONE: TRUNK

## 2019-04-10 NOTE — PROCEDURE: XTRAC LASER
Detail Level: Generalized
Location #4: nails
Consent: see scanned.
Location #3: Axilae, thighs, buttocks
Total Square Area In Cm2 (Required For Proper Billing): 527
Dose Settinge #4 (Mj/Cm2): 6016
Total Energy In Joules: 808.56
Post-Care Instructions: I reviewed with the patient in detail post-care instructions. Patient should stay away from the sun and wear sun protection until treated areas are fully healed.
Dose Setting #2 (Mj/Cm2): 8464
Total Pulses (Will Not Render If 0): 0
Treatment Number: 44
Dose Setting #1 (Mj/Cm2): 1021
Location #2: Knees
Comments: Left scalp skipped due to resolving blisters.
Location #1: Scalp, back, arms, chest
Dose Setting #3 (Mj/Cm2): 987

## 2019-04-15 ENCOUNTER — APPOINTMENT (RX ONLY)
Age: 34
Setting detail: DERMATOLOGY
End: 2019-04-15

## 2019-04-15 DIAGNOSIS — L40.0 PSORIASIS VULGARIS: ICD-10-CM

## 2019-04-15 PROCEDURE — ? XTRAC LASER

## 2019-04-15 PROCEDURE — 96922 EXCIMER LSR PSRIASIS>500SQCM: CPT

## 2019-04-15 ASSESSMENT — LOCATION DETAILED DESCRIPTION DERM: LOCATION DETAILED: RIGHT SUPERIOR MEDIAL UPPER BACK

## 2019-04-15 ASSESSMENT — LOCATION ZONE DERM: LOCATION ZONE: TRUNK

## 2019-04-15 ASSESSMENT — LOCATION SIMPLE DESCRIPTION DERM: LOCATION SIMPLE: RIGHT UPPER BACK

## 2019-04-15 NOTE — PROCEDURE: XTRAC LASER
Post-Care Instructions: I reviewed with the patient in detail post-care instructions. Patient should stay away from the sun and wear sun protection until treated areas are fully healed.
Dose Setting #3 (Mj/Cm2): 987
Location #3: Axilae, thighs, buttocks
Dose Settinge #4 (Mj/Cm2): 9867
Location #1: Scalp, back, arms, chest
Detail Level: Generalized
Comments: Left scalp skipped due to resolving blisters.
Total Energy In Joules: 879.51
Consent: see scanned.
Total Pulses (Will Not Render If 0): 0
Treatment Number: 45
Total Square Area In Cm2 (Required For Proper Billing): 560
Dose Setting #1 (Mj/Cm2): 1029
Dose Setting #2 (Mj/Cm2): 4811
Location #2: Knees
Location #4: nails

## 2019-04-18 ENCOUNTER — APPOINTMENT (RX ONLY)
Age: 34
Setting detail: DERMATOLOGY
End: 2019-04-18

## 2019-04-18 DIAGNOSIS — L40.0 PSORIASIS VULGARIS: ICD-10-CM

## 2019-04-18 PROCEDURE — ? XTRAC LASER

## 2019-04-18 PROCEDURE — 96922 EXCIMER LSR PSRIASIS>500SQCM: CPT

## 2019-04-18 ASSESSMENT — LOCATION ZONE DERM: LOCATION ZONE: TRUNK

## 2019-04-18 ASSESSMENT — LOCATION SIMPLE DESCRIPTION DERM: LOCATION SIMPLE: RIGHT UPPER BACK

## 2019-04-18 ASSESSMENT — LOCATION DETAILED DESCRIPTION DERM: LOCATION DETAILED: RIGHT SUPERIOR MEDIAL UPPER BACK

## 2019-04-18 NOTE — PROCEDURE: XTRAC LASER
Location #3: Axilae, thighs, buttocks
Total Square Area In Cm2 (Required For Proper Billing): 157
Consent: see scanned.
Dose Setting #2 (Mj/Cm2): 4007
Location #4: nails
Dose Setting #3 (Mj/Cm2): 987
Comments: Left scalp skipped due to resolving blisters.
Location #2: Knees
Detail Level: Generalized
Total Pulses (Will Not Render If 0): 0
Treatment Number: 46
Location #1: Scalp, back, arms, chest
Total Energy In Joules: 985.17
Post-Care Instructions: I reviewed with the patient in detail post-care instructions. Patient should stay away from the sun and wear sun protection until treated areas are fully healed.
Dose Settinge #4 (Mj/Cm2): 1425
Dose Setting #1 (Mj/Cm2): 5236

## 2019-05-22 ENCOUNTER — APPOINTMENT (RX ONLY)
Age: 34
Setting detail: DERMATOLOGY
End: 2019-05-22

## 2019-05-22 DIAGNOSIS — L40.0 PSORIASIS VULGARIS: ICD-10-CM

## 2019-05-22 PROCEDURE — 99213 OFFICE O/P EST LOW 20 MIN: CPT

## 2019-05-22 PROCEDURE — ? COUNSELING

## 2019-05-22 PROCEDURE — ? PATIENT SPECIFIC COUNSELING

## 2019-05-22 NOTE — PROCEDURE: PATIENT SPECIFIC COUNSELING
Detail Level: Zone
I discussed in length standard psoriasis medications such as enbrel and humira. I discussed the benefits and risks such as a higher risk for infections and required lab testing. In addition, I discussed newer medications such as stelara. I discuss the benefits such as increased safety due to a lesser effect on the immune system and less required lab testing. However, I discussed the risks of these newer medications since less clinical trials have been performed. If the patient would like further information from a specialist, I will refer him to Dr. Wilber Nava. \\n\\nI counseled the patient to focus applying the topical medication to thinner plaques for better penetration. In addition, I counseled him to moisturize his elbows and knees with a moisturizer such as aquaphor or vaseline. I recommended he take 5,000 IU of Vitamin D and K2 daily.

## 2019-05-23 ENCOUNTER — APPOINTMENT (RX ONLY)
Age: 34
Setting detail: DERMATOLOGY
End: 2019-05-23

## 2019-05-23 DIAGNOSIS — L40.0 PSORIASIS VULGARIS: ICD-10-CM

## 2019-05-23 PROBLEM — L29.8 OTHER PRURITUS: Status: ACTIVE | Noted: 2019-05-23

## 2019-05-23 PROCEDURE — ? XTRAC LASER

## 2019-05-23 PROCEDURE — 96922 EXCIMER LSR PSRIASIS>500SQCM: CPT

## 2019-05-23 ASSESSMENT — LOCATION DETAILED DESCRIPTION DERM: LOCATION DETAILED: RIGHT SUPERIOR MEDIAL UPPER BACK

## 2019-05-23 ASSESSMENT — LOCATION ZONE DERM: LOCATION ZONE: TRUNK

## 2019-05-23 ASSESSMENT — LOCATION SIMPLE DESCRIPTION DERM: LOCATION SIMPLE: RIGHT UPPER BACK

## 2019-05-23 NOTE — PROCEDURE: XTRAC LASER
Dose Setting #3 (Mj/Cm2): 832
Treatment Number: 47
Consent: see scanned.
Location #3: Axilae, thighs, buttocks
Total Square Area In Cm2 (Required For Proper Billing): 571
Post-Care Instructions: I reviewed with the patient in detail post-care instructions. Patient should stay away from the sun and wear sun protection until treated areas are fully healed.
Location #4: nails
Dose Setting #2 (Mj/Cm2): 3353
Total Energy In Joules: 869.56
Location #1: Scalp, back, arms, chest
Location #2: Knees
Dose Setting #1 (Mj/Cm2): 999
Detail Level: Generalized
Comments: Left scalp skipped due to resolving blisters.
Total Pulses (Will Not Render If 0): 0
Dose Settinge #4 (Mj/Cm2): 4741

## 2019-05-28 ENCOUNTER — APPOINTMENT (RX ONLY)
Age: 34
Setting detail: DERMATOLOGY
End: 2019-05-28

## 2019-05-28 DIAGNOSIS — L40.0 PSORIASIS VULGARIS: ICD-10-CM

## 2019-05-28 PROCEDURE — ? XTRAC LASER

## 2019-05-28 PROCEDURE — 96922 EXCIMER LSR PSRIASIS>500SQCM: CPT

## 2019-05-28 ASSESSMENT — LOCATION ZONE DERM: LOCATION ZONE: TRUNK

## 2019-05-28 ASSESSMENT — LOCATION SIMPLE DESCRIPTION DERM: LOCATION SIMPLE: RIGHT UPPER BACK

## 2019-05-28 ASSESSMENT — LOCATION DETAILED DESCRIPTION DERM: LOCATION DETAILED: RIGHT SUPERIOR MEDIAL UPPER BACK

## 2019-05-28 NOTE — PROCEDURE: XTRAC LASER
Dose Setting #2 (Mj/Cm2): 8112
Total Energy In Joules: 837.81
Dose Setting #1 (Mj/Cm2): 840
Total Pulses (Will Not Render If 0): 0
Consent: see scanned.
Location #4: nails
Detail Level: Generalized
Total Square Area In Cm2 (Required For Proper Billing): 484
Location #1: Scalp, back, arms, chest
Treatment Number: 48
Comments: Left scalp skipped due to resolving blisters.
Location #2: Knees
Dose Settinge #4 (Mj/Cm2): 0226
Location #3: Axilae, thighs, buttocks
Post-Care Instructions: I reviewed with the patient in detail post-care instructions. Patient should stay away from the sun and wear sun protection until treated areas are fully healed.
Dose Setting #3 (Mj/Cm2): 968

## 2019-06-11 ENCOUNTER — APPOINTMENT (RX ONLY)
Age: 34
Setting detail: DERMATOLOGY
End: 2019-06-11

## 2019-06-11 DIAGNOSIS — L40.0 PSORIASIS VULGARIS: ICD-10-CM

## 2019-06-11 PROCEDURE — ? XTRAC LASER

## 2019-06-11 PROCEDURE — 96922 EXCIMER LSR PSRIASIS>500SQCM: CPT

## 2019-06-11 ASSESSMENT — LOCATION DETAILED DESCRIPTION DERM: LOCATION DETAILED: RIGHT SUPERIOR MEDIAL UPPER BACK

## 2019-06-11 ASSESSMENT — LOCATION SIMPLE DESCRIPTION DERM: LOCATION SIMPLE: RIGHT UPPER BACK

## 2019-06-11 ASSESSMENT — LOCATION ZONE DERM: LOCATION ZONE: TRUNK

## 2019-06-11 NOTE — PROCEDURE: XTRAC LASER
Total Square Area In Cm2 (Required For Proper Billing): 540
Total Energy In Joules: 768.82
Location #1: Scalp, back, arms, chest
Dose Settinge #4 (Mj/Cm2): 1534
Detail Level: Generalized
Dose Setting #2 (Mj/Cm2): 8158
Dose Setting #1 (Mj/Cm2): 84
Total Pulses (Will Not Render If 0): 0
Location #4: nails
Dose Setting #3 (Mj/Cm2): 960
Consent: see scanned.
Treatment Number: 49
Location #3: Axilae, thighs, buttocks
Comments: Left scalp skipped due to resolving blisters.
Post-Care Instructions: I reviewed with the patient in detail post-care instructions. Patient should stay away from the sun and wear sun protection until treated areas are fully healed.
Location #2: Knees

## 2019-06-17 ENCOUNTER — APPOINTMENT (RX ONLY)
Age: 34
Setting detail: DERMATOLOGY
End: 2019-06-17

## 2019-06-17 DIAGNOSIS — L40.0 PSORIASIS VULGARIS: ICD-10-CM

## 2019-06-17 PROCEDURE — ? XTRAC LASER

## 2019-06-17 PROCEDURE — 96922 EXCIMER LSR PSRIASIS>500SQCM: CPT

## 2019-06-17 ASSESSMENT — LOCATION ZONE DERM: LOCATION ZONE: TRUNK

## 2019-06-17 ASSESSMENT — LOCATION SIMPLE DESCRIPTION DERM: LOCATION SIMPLE: RIGHT UPPER BACK

## 2019-06-17 ASSESSMENT — LOCATION DETAILED DESCRIPTION DERM: LOCATION DETAILED: RIGHT SUPERIOR MEDIAL UPPER BACK

## 2019-06-17 NOTE — PROCEDURE: XTRAC LASER
Total Energy In Joules: 859.26
Location #4: nails
Dose Setting #3 (Mj/Cm2): 96
Dose Settinge #4 (Mj/Cm2): 2590
Total Pulses (Will Not Render If 0): 0
Location #2: Knees
Location #3: Axilae, thighs, buttocks
Comments: Left scalp skipped due to resolving blisters.
Treatment Number: 50
Total Square Area In Cm2 (Required For Proper Billing): 573
Detail Level: Generalized
Location #1: Scalp, back, arms, chest
Post-Care Instructions: I reviewed with the patient in detail post-care instructions. Patient should stay away from the sun and wear sun protection until treated areas are fully healed.
Consent: see scanned.
Dose Setting #1 (Mj/Cm2): 976
Dose Setting #2 (Mj/Cm2): 0454

## 2019-06-24 ENCOUNTER — APPOINTMENT (RX ONLY)
Age: 34
Setting detail: DERMATOLOGY
End: 2019-06-24

## 2019-06-24 DIAGNOSIS — L40.0 PSORIASIS VULGARIS: ICD-10-CM

## 2019-06-24 PROCEDURE — 96922 EXCIMER LSR PSRIASIS>500SQCM: CPT

## 2019-06-24 PROCEDURE — ? XTRAC LASER

## 2019-06-24 ASSESSMENT — LOCATION ZONE DERM: LOCATION ZONE: TRUNK

## 2019-06-24 ASSESSMENT — LOCATION DETAILED DESCRIPTION DERM: LOCATION DETAILED: RIGHT SUPERIOR MEDIAL UPPER BACK

## 2019-06-24 ASSESSMENT — LOCATION SIMPLE DESCRIPTION DERM: LOCATION SIMPLE: RIGHT UPPER BACK

## 2019-06-24 NOTE — PROCEDURE: XTRAC LASER
Total Square Area In Cm2 (Required For Proper Billing): 036
Total Energy In Joules: 745.76
Location #3: Axilae, thighs, buttocks
Location #1: Scalp, back, arms, chest
Consent: see scanned.
Dose Settinge #4 (Mj/Cm2): 7148
Post-Care Instructions: I reviewed with the patient in detail post-care instructions. Patient should stay away from the sun and wear sun protection until treated areas are fully healed.
Dose Setting #1 (Mj/Cm2): 976
Total Pulses (Will Not Render If 0): 0
Detail Level: Generalized
Location #4: nails
Location #2: Knees
Dose Setting #3 (Mj/Cm2): 964
Dose Setting #2 (Mj/Cm2): 8756
Treatment Number: 51
Comments: Left scalp skipped due to resolving blisters.

## 2019-07-01 ENCOUNTER — APPOINTMENT (RX ONLY)
Age: 34
Setting detail: DERMATOLOGY
End: 2019-07-01

## 2019-07-01 DIAGNOSIS — L40.0 PSORIASIS VULGARIS: ICD-10-CM

## 2019-07-01 PROCEDURE — 96921 EXCIMER LSR PSRIASIS 250-500: CPT

## 2019-07-01 PROCEDURE — ? XTRAC LASER

## 2019-07-01 ASSESSMENT — LOCATION SIMPLE DESCRIPTION DERM: LOCATION SIMPLE: RIGHT UPPER BACK

## 2019-07-01 ASSESSMENT — LOCATION ZONE DERM: LOCATION ZONE: TRUNK

## 2019-07-01 ASSESSMENT — LOCATION DETAILED DESCRIPTION DERM: LOCATION DETAILED: RIGHT SUPERIOR MEDIAL UPPER BACK

## 2019-07-01 NOTE — PROCEDURE: XTRAC LASER
Dose Setting #3 (Mj/Cm2): 962
Consent: see scanned.
Location #1: Scalp, back, arms, chest
Location #2: Knees
Total Square Area In Cm2 (Required For Proper Billing): 488
Location #4: nails
Treatment Number: 52
Dose Settinge #4 (Mj/Cm2): 4031
Location #3: Axilae, thighs, buttocks
Post-Care Instructions: I reviewed with the patient in detail post-care instructions. Patient should stay away from the sun and wear sun protection until treated areas are fully healed.
Dose Setting #2 (Mj/Cm2): 0381
Detail Level: Generalized
Total Energy In Joules: 776.24
Total Pulses (Will Not Render If 0): 0
Dose Setting #1 (Mj/Cm2): 976
Comments: Left scalp skipped due to resolving blisters.

## 2019-07-09 ENCOUNTER — APPOINTMENT (RX ONLY)
Age: 34
Setting detail: DERMATOLOGY
End: 2019-07-09

## 2019-07-09 DIAGNOSIS — L40.0 PSORIASIS VULGARIS: ICD-10-CM

## 2019-07-09 PROCEDURE — ? XTRAC LASER

## 2019-07-09 PROCEDURE — 96921 EXCIMER LSR PSRIASIS 250-500: CPT

## 2019-07-09 ASSESSMENT — LOCATION SIMPLE DESCRIPTION DERM: LOCATION SIMPLE: RIGHT UPPER BACK

## 2019-07-09 ASSESSMENT — LOCATION ZONE DERM: LOCATION ZONE: TRUNK

## 2019-07-09 ASSESSMENT — LOCATION DETAILED DESCRIPTION DERM: LOCATION DETAILED: RIGHT SUPERIOR MEDIAL UPPER BACK

## 2019-07-09 NOTE — PROCEDURE: XTRAC LASER
Dose Setting #2 (Mj/Cm2): 1854
Location #3: thighs, buttocks
Total Square Area In Cm2 (Required For Proper Billing): 420
Comments: Left scalp skipped due to resolving blisters.
Location #1: Scalp, back, arms, chest
Consent: see scanned.
Post-Care Instructions: I reviewed with the patient in detail post-care instructions. Patient should stay away from the sun and wear sun protection until treated areas are fully healed.
Location #2: Knees
Detail Level: Generalized
Total Pulses (Will Not Render If 0): 0
Total Energy In Joules: 701.43
Dose Setting #3 (Mj/Cm2): 96
Dose Settinge #4 (Mj/Cm2): 0916
Location #4: nails
Treatment Number: 53
Dose Setting #1 (Mj/Cm2): 976

## 2019-07-15 ENCOUNTER — APPOINTMENT (RX ONLY)
Age: 34
Setting detail: DERMATOLOGY
End: 2019-07-15

## 2019-07-15 DIAGNOSIS — L40.0 PSORIASIS VULGARIS: ICD-10-CM

## 2019-07-15 PROCEDURE — ? XTRAC LASER

## 2019-07-15 PROCEDURE — 96921 EXCIMER LSR PSRIASIS 250-500: CPT

## 2019-07-15 ASSESSMENT — LOCATION DETAILED DESCRIPTION DERM: LOCATION DETAILED: RIGHT SUPERIOR MEDIAL UPPER BACK

## 2019-07-15 ASSESSMENT — LOCATION ZONE DERM: LOCATION ZONE: TRUNK

## 2019-07-15 ASSESSMENT — LOCATION SIMPLE DESCRIPTION DERM: LOCATION SIMPLE: RIGHT UPPER BACK

## 2019-07-15 NOTE — PROCEDURE: XTRAC LASER
Location #1: Scalp, back, arms, chest
Dose Setting #2 (Mj/Cm2): 8696
Location #4: nails
Total Energy In Joules: 699.61
Total Pulses (Will Not Render If 0): 0
Location #2: Knees
Dose Setting #1 (Mj/Cm2): 976
Comments: Left scalp skipped due to resolving blisters.
Total Square Area In Cm2 (Required For Proper Billing): 428
Post-Care Instructions: I reviewed with the patient in detail post-care instructions. Patient should stay away from the sun and wear sun protection until treated areas are fully healed.
Dose Settinge #4 (Mj/Cm2): 1750
Dose Setting #3 (Mj/Cm2): 968
Location #3: thighs, buttocks
Consent: see scanned.
Treatment Number: 54
Detail Level: Generalized

## 2019-07-22 ENCOUNTER — APPOINTMENT (RX ONLY)
Age: 34
Setting detail: DERMATOLOGY
End: 2019-07-22

## 2019-07-22 DIAGNOSIS — L40.0 PSORIASIS VULGARIS: ICD-10-CM

## 2019-07-22 PROCEDURE — ? XTRAC LASER

## 2019-07-22 PROCEDURE — 96922 EXCIMER LSR PSRIASIS>500SQCM: CPT

## 2019-07-22 ASSESSMENT — LOCATION DETAILED DESCRIPTION DERM: LOCATION DETAILED: RIGHT SUPERIOR MEDIAL UPPER BACK

## 2019-07-22 ASSESSMENT — LOCATION SIMPLE DESCRIPTION DERM: LOCATION SIMPLE: RIGHT UPPER BACK

## 2019-07-22 ASSESSMENT — LOCATION ZONE DERM: LOCATION ZONE: TRUNK

## 2019-07-22 NOTE — PROCEDURE: XTRAC LASER
Location #3: thighs, buttocks
Comments: Left scalp skipped due to resolving blisters.
Detail Level: Generalized
Total Square Area In Cm2 (Required For Proper Billing): 534
Consent: see scanned.
Dose Setting #2 (Mj/Cm2): 2049
Total Pulses (Will Not Render If 0): 0
Dose Setting #1 (Mj/Cm2): 976
Dose Setting #3 (Mj/Cm2): 962
Location #1: Scalp, back, arms, chest
Total Energy In Joules: 814.65
Location #2: Knees
Dose Settinge #4 (Mj/Cm2): 0806
Treatment Number: 55
Post-Care Instructions: I reviewed with the patient in detail post-care instructions. Patient should stay away from the sun and wear sun protection until treated areas are fully healed.
Location #4: nails

## 2019-07-29 ENCOUNTER — APPOINTMENT (RX ONLY)
Age: 34
Setting detail: DERMATOLOGY
End: 2019-07-29

## 2019-07-29 DIAGNOSIS — L40.0 PSORIASIS VULGARIS: ICD-10-CM

## 2019-07-29 PROCEDURE — ? XTRAC LASER

## 2019-07-29 PROCEDURE — 96921 EXCIMER LSR PSRIASIS 250-500: CPT

## 2019-07-29 ASSESSMENT — LOCATION SIMPLE DESCRIPTION DERM: LOCATION SIMPLE: RIGHT UPPER BACK

## 2019-07-29 ASSESSMENT — LOCATION DETAILED DESCRIPTION DERM: LOCATION DETAILED: RIGHT SUPERIOR MEDIAL UPPER BACK

## 2019-07-29 ASSESSMENT — LOCATION ZONE DERM: LOCATION ZONE: TRUNK

## 2019-07-29 NOTE — PROCEDURE: XTRAC LASER
Dose Setting #3 (Mj/Cm2): 966
Location #4: nails
Total Pulses (Will Not Render If 0): 0
Post-Care Instructions: I reviewed with the patient in detail post-care instructions. Patient should stay away from the sun and wear sun protection until treated areas are fully healed.
Location #3: thighs, buttocks
Detail Level: Generalized
Location #2: Knees
Comments: Left scalp skipped due to resolving blisters.
Dose Setting #2 (Mj/Cm2): 3002
Location #1: Scalp, back, arms, chest
Dose Setting #1 (Mj/Cm2): 1024
Treatment Number: 56
Consent: see scanned.
Total Energy In Joules: 802.64
Total Square Area In Cm2 (Required For Proper Billing): 488

## 2019-08-05 ENCOUNTER — APPOINTMENT (RX ONLY)
Age: 34
Setting detail: DERMATOLOGY
End: 2019-08-05

## 2019-08-05 DIAGNOSIS — L40.0 PSORIASIS VULGARIS: ICD-10-CM

## 2019-08-05 PROCEDURE — ? XTRAC LASER

## 2019-08-05 PROCEDURE — 96922 EXCIMER LSR PSRIASIS>500SQCM: CPT

## 2019-08-05 ASSESSMENT — LOCATION DETAILED DESCRIPTION DERM: LOCATION DETAILED: RIGHT SUPERIOR MEDIAL UPPER BACK

## 2019-08-05 ASSESSMENT — LOCATION SIMPLE DESCRIPTION DERM: LOCATION SIMPLE: RIGHT UPPER BACK

## 2019-08-05 ASSESSMENT — LOCATION ZONE DERM: LOCATION ZONE: TRUNK

## 2019-08-05 NOTE — PROCEDURE: XTRAC LASER
Location #1: Scalp, back, arms, chest
Total Pulses (Will Not Render If 0): 0
Dose Setting #1 (Mj/Cm2): 3470
Location #4: nails
Detail Level: Generalized
Total Square Area In Cm2 (Required For Proper Billing): 520
Dose Setting #2 (Mj/Cm2): 9094
Comments: Left scalp skipped due to resolving blisters.
Consent: see scanned.
Treatment Number: 57
Post-Care Instructions: I reviewed with the patient in detail post-care instructions. Patient should stay away from the sun and wear sun protection until treated areas are fully healed.
Total Energy In Joules: 941.02
Location #2: Knees
Location #3: thighs, buttocks
Dose Settinge #4 (Mj/Cm2): 8503

## 2019-08-08 ENCOUNTER — APPOINTMENT (RX ONLY)
Age: 34
Setting detail: DERMATOLOGY
End: 2019-08-08

## 2019-08-08 DIAGNOSIS — L40.0 PSORIASIS VULGARIS: ICD-10-CM

## 2019-08-08 PROCEDURE — 96921 EXCIMER LSR PSRIASIS 250-500: CPT

## 2019-08-08 PROCEDURE — ? XTRAC LASER

## 2019-08-08 ASSESSMENT — LOCATION DETAILED DESCRIPTION DERM: LOCATION DETAILED: RIGHT SUPERIOR MEDIAL UPPER BACK

## 2019-08-08 ASSESSMENT — LOCATION SIMPLE DESCRIPTION DERM: LOCATION SIMPLE: RIGHT UPPER BACK

## 2019-08-08 ASSESSMENT — LOCATION ZONE DERM: LOCATION ZONE: TRUNK

## 2019-08-08 NOTE — PROCEDURE: XTRAC LASER
Location #3: thighs, buttocks
Comments: Left scalp skipped due to resolving blisters.
Dose Setting #3 (Mj/Cm2): 1565
Dose Settinge #4 (Mj/Cm2): 1027
Location #2: Knees
Total Energy In Joules: 865.82
Consent: see scanned.
Treatment Number: 57
Location #1: Scalp, back, arms, chest
Total Pulses (Will Not Render If 0): 0
Total Square Area In Cm2 (Required For Proper Billing): 420
Location #4: nails
Dose Setting #1 (Mj/Cm2): 0318
Detail Level: Generalized
Post-Care Instructions: I reviewed with the patient in detail post-care instructions. Patient should stay away from the sun and wear sun protection until treated areas are fully healed.

## 2019-08-12 ENCOUNTER — APPOINTMENT (RX ONLY)
Age: 34
Setting detail: DERMATOLOGY
End: 2019-08-12

## 2019-08-12 DIAGNOSIS — L40.0 PSORIASIS VULGARIS: ICD-10-CM

## 2019-08-12 PROCEDURE — ? XTRAC LASER

## 2019-08-12 PROCEDURE — 96921 EXCIMER LSR PSRIASIS 250-500: CPT

## 2019-08-12 ASSESSMENT — LOCATION SIMPLE DESCRIPTION DERM: LOCATION SIMPLE: RIGHT UPPER BACK

## 2019-08-12 ASSESSMENT — LOCATION ZONE DERM: LOCATION ZONE: TRUNK

## 2019-08-12 ASSESSMENT — LOCATION DETAILED DESCRIPTION DERM: LOCATION DETAILED: RIGHT SUPERIOR MEDIAL UPPER BACK

## 2019-08-12 NOTE — PROCEDURE: XTRAC LASER
Consent: see scanned.
Dose Setting #2 (Mj/Cm2): 7339
Detail Level: Generalized
Total Energy In Joules: 861.64
Dose Settinge #4 (Mj/Cm2): 9917
Location #3: thighs, buttocks
Location #1: Scalp, back, arms, chest
Dose Setting #1 (Mj/Cm2): 1737
Location #4: nails
Comments: Left scalp skipped due to resolving blisters.
Total Square Area In Cm2 (Required For Proper Billing): 437
Total Pulses (Will Not Render If 0): 0
Treatment Number: 59
Post-Care Instructions: I reviewed with the patient in detail post-care instructions. Patient should stay away from the sun and wear sun protection until treated areas are fully healed.
Location #2: Knees

## 2019-08-15 ENCOUNTER — APPOINTMENT (RX ONLY)
Age: 34
Setting detail: DERMATOLOGY
End: 2019-08-15

## 2019-08-15 DIAGNOSIS — L40.0 PSORIASIS VULGARIS: ICD-10-CM

## 2019-08-15 PROCEDURE — 96921 EXCIMER LSR PSRIASIS 250-500: CPT

## 2019-08-15 PROCEDURE — ? XTRAC LASER

## 2019-08-15 ASSESSMENT — LOCATION SIMPLE DESCRIPTION DERM: LOCATION SIMPLE: RIGHT UPPER BACK

## 2019-08-15 ASSESSMENT — LOCATION ZONE DERM: LOCATION ZONE: TRUNK

## 2019-08-15 ASSESSMENT — LOCATION DETAILED DESCRIPTION DERM: LOCATION DETAILED: RIGHT SUPERIOR MEDIAL UPPER BACK

## 2019-08-15 NOTE — PROCEDURE: XTRAC LASER
Dose Setting #1 (Mj/Cm2): 8192
Post-Care Instructions: I reviewed with the patient in detail post-care instructions. Patient should stay away from the sun and wear sun protection until treated areas are fully healed.
Location #1: Scalp, back, arms, chest
Detail Level: Generalized
Total Square Area In Cm2 (Required For Proper Billing): 480
Consent: see scanned.
Comments: Left scalp skipped due to resolving blisters.
Dose Settinge #4 (Mj/Cm2): 4927
Treatment Number: 60
Location #4: nails
Total Pulses (Will Not Render If 0): 0
Total Energy In Joules: 906.90
Location #3: thighs, buttocks
Location #2: Knees

## 2019-08-21 ENCOUNTER — APPOINTMENT (RX ONLY)
Age: 34
Setting detail: DERMATOLOGY
End: 2019-08-21

## 2019-08-21 DIAGNOSIS — L40.0 PSORIASIS VULGARIS: ICD-10-CM

## 2019-08-21 PROCEDURE — 96921 EXCIMER LSR PSRIASIS 250-500: CPT

## 2019-08-21 PROCEDURE — ? XTRAC LASER

## 2019-08-21 ASSESSMENT — LOCATION DETAILED DESCRIPTION DERM: LOCATION DETAILED: RIGHT SUPERIOR MEDIAL UPPER BACK

## 2019-08-21 ASSESSMENT — LOCATION SIMPLE DESCRIPTION DERM: LOCATION SIMPLE: RIGHT UPPER BACK

## 2019-08-21 ASSESSMENT — LOCATION ZONE DERM: LOCATION ZONE: TRUNK

## 2019-08-21 NOTE — PROCEDURE: XTRAC LASER
Total Energy In Joules: 890.67
Dose Setting #3 (Mj/Cm2): 9578
Post-Care Instructions: I reviewed with the patient in detail post-care instructions. Patient should stay away from the sun and wear sun protection until treated areas are fully healed.
Location #3: thighs, buttocks
Treatment Number: 61
Location #2: Knees
Total Square Area In Cm2 (Required For Proper Billing): 489
Location #1: Scalp, back, arms, chest
Comments: Left scalp skipped due to resolving blisters.
Total Pulses (Will Not Render If 0): 0
Consent: see scanned.
Detail Level: Generalized
Location #4: nails
Dose Setting #1 (Mj/Cm2): 4811

## 2019-09-12 ENCOUNTER — APPOINTMENT (RX ONLY)
Age: 34
Setting detail: DERMATOLOGY
End: 2019-09-12

## 2019-09-12 DIAGNOSIS — L40.0 PSORIASIS VULGARIS: ICD-10-CM

## 2019-09-12 PROCEDURE — 96921 EXCIMER LSR PSRIASIS 250-500: CPT

## 2019-09-12 PROCEDURE — ? XTRAC LASER

## 2019-09-12 ASSESSMENT — LOCATION SIMPLE DESCRIPTION DERM: LOCATION SIMPLE: RIGHT UPPER BACK

## 2019-09-12 ASSESSMENT — LOCATION ZONE DERM: LOCATION ZONE: TRUNK

## 2019-09-12 ASSESSMENT — LOCATION DETAILED DESCRIPTION DERM: LOCATION DETAILED: RIGHT SUPERIOR MEDIAL UPPER BACK

## 2019-09-12 NOTE — PROCEDURE: XTRAC LASER
Dose Setting #3 (Mj/Cm2): 1000
Total Pulses (Will Not Render If 0): 0
Comments: Left scalp skipped due to resolving blisters.
Post-Care Instructions: I reviewed with the patient in detail post-care instructions. Patient should stay away from the sun and wear sun protection until treated areas are fully healed.
Dose Settinge #4 (Mj/Cm2): 4417
Total Square Area In Cm2 (Required For Proper Billing): 687
Location #4: nails
Treatment Number: 62
Dose Setting #1 (Mj/Cm2): 0494
Location #3: buttocks
Detail Level: Generalized
Total Energy In Joules: 929.59
Location #1: Scalp, back, arms, chest
Consent: see scanned.
Location #2: Knees

## 2019-09-16 ENCOUNTER — RX ONLY (OUTPATIENT)
Age: 34
Setting detail: RX ONLY
End: 2019-09-16

## 2019-09-16 ENCOUNTER — APPOINTMENT (RX ONLY)
Age: 34
Setting detail: DERMATOLOGY
End: 2019-09-16

## 2019-09-16 DIAGNOSIS — L40.0 PSORIASIS VULGARIS: ICD-10-CM

## 2019-09-16 PROCEDURE — ? XTRAC LASER

## 2019-09-16 PROCEDURE — 96921 EXCIMER LSR PSRIASIS 250-500: CPT

## 2019-09-16 RX ORDER — CALCIPOTRIENE AND BETAMETHASONE DIPROPIONATE 50; .5 UG/G; MG/G
OINTMENT TOPICAL
Qty: 2 | Refills: 5 | Status: ERX

## 2019-09-16 ASSESSMENT — LOCATION SIMPLE DESCRIPTION DERM: LOCATION SIMPLE: RIGHT UPPER BACK

## 2019-09-16 ASSESSMENT — LOCATION DETAILED DESCRIPTION DERM: LOCATION DETAILED: RIGHT SUPERIOR MEDIAL UPPER BACK

## 2019-09-16 ASSESSMENT — LOCATION ZONE DERM: LOCATION ZONE: TRUNK

## 2019-09-16 NOTE — PROCEDURE: XTRAC LASER
Detail Level: Generalized
Dose Setting #2 (Mj/Cm2): 0083
Location #2: Knees
Consent: see scanned.
Treatment Number: 63
Total Energy In Joules: 851.54
Location #4: nails
Location #1: Scalp, back, arms, chest
Total Square Area In Cm2 (Required For Proper Billing): 428
Location #3: buttocks
Dose Setting #3 (Mj/Cm2): 1000
Comments: Left scalp skipped due to resolving blisters.
Post-Care Instructions: I reviewed with the patient in detail post-care instructions. Patient should stay away from the sun and wear sun protection until treated areas are fully healed.
Total Pulses (Will Not Render If 0): 0
Dose Setting #1 (Mj/Cm2): 7189

## 2019-09-30 ENCOUNTER — APPOINTMENT (RX ONLY)
Age: 34
Setting detail: DERMATOLOGY
End: 2019-09-30

## 2019-09-30 DIAGNOSIS — L40.0 PSORIASIS VULGARIS: ICD-10-CM

## 2019-09-30 PROCEDURE — ? XTRAC LASER

## 2019-09-30 PROCEDURE — 96921 EXCIMER LSR PSRIASIS 250-500: CPT

## 2019-09-30 ASSESSMENT — LOCATION DETAILED DESCRIPTION DERM: LOCATION DETAILED: RIGHT SUPERIOR MEDIAL UPPER BACK

## 2019-09-30 ASSESSMENT — LOCATION ZONE DERM: LOCATION ZONE: TRUNK

## 2019-09-30 ASSESSMENT — LOCATION SIMPLE DESCRIPTION DERM: LOCATION SIMPLE: RIGHT UPPER BACK

## 2019-09-30 NOTE — PROCEDURE: XTRAC LASER
Location #1: Scalp, back, arms, chest
Location #3: buttocks
Total Square Area In Cm2 (Required For Proper Billing): 456
Dose Settinge #4 (Mj/Cm2): 1040
Location #4: nails
Detail Level: Generalized
Treatment Number: 64
Total Energy In Joules: 961.72
Total Pulses (Will Not Render If 0): 0
Location #2: Knees
Dose Setting #3 (Mj/Cm2): 1000
Post-Care Instructions: I reviewed with the patient in detail post-care instructions. Patient should stay away from the sun and wear sun protection until treated areas are fully healed.
Consent: see scanned.
Dose Setting #1 (Mj/Cm2): 4625
Comments: Left scalp skipped due to resolving blisters.

## 2019-10-03 ENCOUNTER — APPOINTMENT (RX ONLY)
Age: 34
Setting detail: DERMATOLOGY
End: 2019-10-03

## 2019-10-03 DIAGNOSIS — L40.0 PSORIASIS VULGARIS: ICD-10-CM

## 2019-10-03 PROCEDURE — 96921 EXCIMER LSR PSRIASIS 250-500: CPT

## 2019-10-03 PROCEDURE — ? XTRAC LASER

## 2019-10-03 ASSESSMENT — LOCATION DETAILED DESCRIPTION DERM: LOCATION DETAILED: RIGHT SUPERIOR MEDIAL UPPER BACK

## 2019-10-03 ASSESSMENT — LOCATION SIMPLE DESCRIPTION DERM: LOCATION SIMPLE: RIGHT UPPER BACK

## 2019-10-03 ASSESSMENT — LOCATION ZONE DERM: LOCATION ZONE: TRUNK

## 2019-10-03 NOTE — PROCEDURE: XTRAC LASER
Location #3: buttocks
Total Energy In Joules: 796.51
Detail Level: Generalized
Location #1: Scalp, back, arms, chest
Comments: Left scalp skipped due to resolving blisters.
Consent: see scanned.
Total Square Area In Cm2 (Required For Proper Billing): 424
Dose Settinge #4 (Mj/Cm2): 9702
Dose Setting #1 (Mj/Cm2): 4741
Location #4: nails
Dose Setting #3 (Mj/Cm2): 1000
Treatment Number: 65
Total Pulses (Will Not Render If 0): 0
Location #2: Knees
Post-Care Instructions: I reviewed with the patient in detail post-care instructions. Patient should stay away from the sun and wear sun protection until treated areas are fully healed.

## 2019-10-07 ENCOUNTER — APPOINTMENT (RX ONLY)
Age: 34
Setting detail: DERMATOLOGY
End: 2019-10-07

## 2019-10-07 DIAGNOSIS — L40.0 PSORIASIS VULGARIS: ICD-10-CM

## 2019-10-07 PROCEDURE — 96921 EXCIMER LSR PSRIASIS 250-500: CPT

## 2019-10-07 PROCEDURE — ? XTRAC LASER

## 2019-10-07 ASSESSMENT — LOCATION ZONE DERM: LOCATION ZONE: TRUNK

## 2019-10-07 ASSESSMENT — LOCATION SIMPLE DESCRIPTION DERM: LOCATION SIMPLE: RIGHT UPPER BACK

## 2019-10-07 ASSESSMENT — LOCATION DETAILED DESCRIPTION DERM: LOCATION DETAILED: RIGHT SUPERIOR MEDIAL UPPER BACK

## 2019-10-07 NOTE — PROCEDURE: XTRAC LASER
Location #3: buttocks
Comments: Left scalp skipped due to resolving blisters.
Total Pulses (Will Not Render If 0): 0
Dose Setting #3 (Mj/Cm2): 1000
Location #1: Scalp, back, arms, chest
Total Square Area In Cm2 (Required For Proper Billing): 412
Dose Settinge #4 (Mj/Cm2): 6245
Post-Care Instructions: I reviewed with the patient in detail post-care instructions. Patient should stay away from the sun and wear sun protection until treated areas are fully healed.
Consent: see scanned.
Treatment Number: 66
Total Energy In Joules: 880.98
Detail Level: Generalized
Location #2: Knees
Dose Setting #1 (Mj/Cm2): 7671
Location #4: nails

## 2019-10-10 ENCOUNTER — APPOINTMENT (RX ONLY)
Age: 34
Setting detail: DERMATOLOGY
End: 2019-10-10

## 2019-10-10 DIAGNOSIS — L40.0 PSORIASIS VULGARIS: ICD-10-CM

## 2019-10-10 PROCEDURE — 96921 EXCIMER LSR PSRIASIS 250-500: CPT

## 2019-10-10 PROCEDURE — ? XTRAC LASER

## 2019-10-10 ASSESSMENT — LOCATION SIMPLE DESCRIPTION DERM: LOCATION SIMPLE: RIGHT UPPER BACK

## 2019-10-10 ASSESSMENT — LOCATION DETAILED DESCRIPTION DERM: LOCATION DETAILED: RIGHT SUPERIOR MEDIAL UPPER BACK

## 2019-10-10 ASSESSMENT — LOCATION ZONE DERM: LOCATION ZONE: TRUNK

## 2019-10-10 NOTE — PROCEDURE: XTRAC LASER
Post-Care Instructions: I reviewed with the patient in detail post-care instructions. Patient should stay away from the sun and wear sun protection until treated areas are fully healed.
Total Pulses (Will Not Render If 0): 0
Location #3: buttocks
Dose Settinge #4 (Mj/Cm2): 9938
Detail Level: Generalized
Location #4: nails
Location #1: Scalp, back, arms, chest
Total Square Area In Cm2 (Required For Proper Billing): 460
Consent: see scanned.
Comments: Left scalp skipped due to resolving blisters.
Location #2: Knees
Total Energy In Joules: 989.84
Dose Setting #3 (Mj/Cm2): 1000
Dose Setting #1 (Mj/Cm2): 6412
Treatment Number: 67

## 2019-10-14 ENCOUNTER — APPOINTMENT (RX ONLY)
Age: 34
Setting detail: DERMATOLOGY
End: 2019-10-14

## 2019-10-14 DIAGNOSIS — L40.0 PSORIASIS VULGARIS: ICD-10-CM

## 2019-10-14 PROCEDURE — 96921 EXCIMER LSR PSRIASIS 250-500: CPT

## 2019-10-14 PROCEDURE — ? XTRAC LASER

## 2019-10-14 ASSESSMENT — LOCATION ZONE DERM: LOCATION ZONE: TRUNK

## 2019-10-14 ASSESSMENT — LOCATION DETAILED DESCRIPTION DERM: LOCATION DETAILED: RIGHT SUPERIOR MEDIAL UPPER BACK

## 2019-10-14 ASSESSMENT — LOCATION SIMPLE DESCRIPTION DERM: LOCATION SIMPLE: RIGHT UPPER BACK

## 2019-10-14 NOTE — PROCEDURE: XTRAC LASER
Total Square Area In Cm2 (Required For Proper Billing): 432
Consent: see scanned.
Treatment Number: 68
Total Pulses (Will Not Render If 0): 0
Location #3: buttocks
Dose Setting #3 (Mj/Cm2): 1000
Post-Care Instructions: I reviewed with the patient in detail post-care instructions. Patient should stay away from the sun and wear sun protection until treated areas are fully healed.
Dose Setting #2 (Mj/Cm2): 3082
Location #1: Scalp
Dose Settinge #4 (Mj/Cm2): 1300
Comments: Left scalp skipped due to resolving blisters.
Location #2: Knees, nails
Detail Level: Generalized
Dose Setting #1 (Mj/Cm2): 5923
Total Energy In Joules: 970.72
Location #4: trunk

## 2019-10-28 ENCOUNTER — APPOINTMENT (RX ONLY)
Age: 34
Setting detail: DERMATOLOGY
End: 2019-10-28

## 2019-10-28 DIAGNOSIS — L40.0 PSORIASIS VULGARIS: ICD-10-CM

## 2019-10-28 PROCEDURE — 96921 EXCIMER LSR PSRIASIS 250-500: CPT

## 2019-10-28 PROCEDURE — ? XTRAC LASER

## 2019-10-28 ASSESSMENT — LOCATION SIMPLE DESCRIPTION DERM: LOCATION SIMPLE: RIGHT UPPER BACK

## 2019-10-28 ASSESSMENT — LOCATION DETAILED DESCRIPTION DERM: LOCATION DETAILED: RIGHT SUPERIOR MEDIAL UPPER BACK

## 2019-10-28 ASSESSMENT — LOCATION ZONE DERM: LOCATION ZONE: TRUNK

## 2019-10-28 NOTE — PROCEDURE: XTRAC LASER
Post-Care Instructions: I reviewed with the patient in detail post-care instructions. Patient should stay away from the sun and wear sun protection until treated areas are fully healed.
Total Square Area In Cm2 (Required For Proper Billing): 456
Consent: see scanned.
Treatment Number: 69
Location #3: buttocks
Total Energy In Joules: 1033.92
Total Pulses (Will Not Render If 0): 0
Location #2: Knees, nails
Comments: Left scalp skipped due to resolving blisters.
Dose Setting #2 (Mj/Cm2): 9254
Location #4: trunk
Dose Settinge #4 (Mj/Cm2): 1300
Dose Setting #3 (Mj/Cm2): 1000
Detail Level: Generalized
Dose Setting #1 (Mj/Cm2): 8113
Location #1: Scalp

## 2019-10-31 ENCOUNTER — APPOINTMENT (RX ONLY)
Age: 34
Setting detail: DERMATOLOGY
End: 2019-10-31

## 2019-10-31 DIAGNOSIS — L40.0 PSORIASIS VULGARIS: ICD-10-CM

## 2019-10-31 PROCEDURE — 96921 EXCIMER LSR PSRIASIS 250-500: CPT

## 2019-10-31 PROCEDURE — ? XTRAC LASER

## 2019-10-31 ASSESSMENT — LOCATION SIMPLE DESCRIPTION DERM: LOCATION SIMPLE: RIGHT UPPER BACK

## 2019-10-31 ASSESSMENT — LOCATION DETAILED DESCRIPTION DERM: LOCATION DETAILED: RIGHT SUPERIOR MEDIAL UPPER BACK

## 2019-10-31 ASSESSMENT — LOCATION ZONE DERM: LOCATION ZONE: TRUNK

## 2019-10-31 NOTE — PROCEDURE: XTRAC LASER
Detail Level: Generalized
Dose Setting #3 (Mj/Cm2): 1000
Location #1: Scalp, trunk
Total Energy In Joules: 990.06
Consent: see scanned.
Total Square Area In Cm2 (Required For Proper Billing): 447
Comments: Left scalp skipped due to resolving blisters.
Post-Care Instructions: I reviewed with the patient in detail post-care instructions. Patient should stay away from the sun and wear sun protection until treated areas are fully healed.
Dose Setting #2 (Mj/Cm2): 3501
Total Pulses (Will Not Render If 0): 0
Treatment Number: 70
Location #2: Knees, nails
Location #3: buttocks
Dose Setting #1 (Mj/Cm2): 3363

## 2019-11-05 ENCOUNTER — APPOINTMENT (RX ONLY)
Age: 34
Setting detail: DERMATOLOGY
End: 2019-11-05

## 2019-11-05 DIAGNOSIS — L40.0 PSORIASIS VULGARIS: ICD-10-CM

## 2019-11-05 PROCEDURE — ? XTRAC LASER

## 2019-11-05 PROCEDURE — 96921 EXCIMER LSR PSRIASIS 250-500: CPT

## 2019-11-05 ASSESSMENT — LOCATION SIMPLE DESCRIPTION DERM: LOCATION SIMPLE: RIGHT UPPER BACK

## 2019-11-05 ASSESSMENT — LOCATION DETAILED DESCRIPTION DERM: LOCATION DETAILED: RIGHT SUPERIOR MEDIAL UPPER BACK

## 2019-11-05 ASSESSMENT — LOCATION ZONE DERM: LOCATION ZONE: TRUNK

## 2019-11-05 NOTE — PROCEDURE: XTRAC LASER
Treatment Number: 71
Total Energy In Joules: 1072.68
Dose Setting #2 (Mj/Cm2): 8149
Total Pulses (Will Not Render If 0): 0
Location #2: Knees, nails
Consent: see scanned.
Dose Setting #1 (Mj/Cm2): 2021
Comments: Left scalp skipped due to resolving blisters.
Location #1: Scalp, trunk
Location #3: buttocks
Total Square Area In Cm2 (Required For Proper Billing): 500
Detail Level: Generalized
Dose Setting #3 (Mj/Cm2): 1000
Post-Care Instructions: I reviewed with the patient in detail post-care instructions. Patient should stay away from the sun and wear sun protection until treated areas are fully healed.

## 2019-11-11 ENCOUNTER — APPOINTMENT (RX ONLY)
Age: 34
Setting detail: DERMATOLOGY
End: 2019-11-11

## 2019-11-11 DIAGNOSIS — L40.0 PSORIASIS VULGARIS: ICD-10-CM

## 2019-11-11 PROCEDURE — ? XTRAC LASER

## 2019-11-11 PROCEDURE — 96921 EXCIMER LSR PSRIASIS 250-500: CPT

## 2019-11-11 ASSESSMENT — LOCATION DETAILED DESCRIPTION DERM: LOCATION DETAILED: RIGHT SUPERIOR MEDIAL UPPER BACK

## 2019-11-11 ASSESSMENT — LOCATION ZONE DERM: LOCATION ZONE: TRUNK

## 2019-11-11 ASSESSMENT — LOCATION SIMPLE DESCRIPTION DERM: LOCATION SIMPLE: RIGHT UPPER BACK

## 2019-11-11 NOTE — PROCEDURE: XTRAC LASER
Dose Setting #2 (Mj/Cm2): 0716
Comments: Left scalp skipped due to resolving blisters.
Treatment Number: 72
Location #3: buttocks
Total Square Area In Cm2 (Required For Proper Billing): 410
Location #1: Scalp, trunk
Dose Setting #3 (Mj/Cm2): 1000
Detail Level: Generalized
Total Energy In Joules: 985.65
Total Pulses (Will Not Render If 0): 0
Location #2: Knees, nails
Dose Setting #1 (Mj/Cm2): 5557
Post-Care Instructions: I reviewed with the patient in detail post-care instructions. Patient should stay away from the sun and wear sun protection until treated areas are fully healed.
Consent: see scanned.

## 2019-11-14 ENCOUNTER — APPOINTMENT (RX ONLY)
Age: 34
Setting detail: DERMATOLOGY
End: 2019-11-14

## 2019-11-14 DIAGNOSIS — L40.0 PSORIASIS VULGARIS: ICD-10-CM

## 2019-11-14 PROCEDURE — 96921 EXCIMER LSR PSRIASIS 250-500: CPT

## 2019-11-14 PROCEDURE — ? XTRAC LASER

## 2019-11-14 ASSESSMENT — LOCATION SIMPLE DESCRIPTION DERM: LOCATION SIMPLE: RIGHT UPPER BACK

## 2019-11-14 ASSESSMENT — LOCATION ZONE DERM: LOCATION ZONE: TRUNK

## 2019-11-14 ASSESSMENT — LOCATION DETAILED DESCRIPTION DERM: LOCATION DETAILED: RIGHT SUPERIOR MEDIAL UPPER BACK

## 2019-11-14 NOTE — PROCEDURE: XTRAC LASER
Total Pulses (Will Not Render If 0): 0
Consent: see scanned.
Post-Care Instructions: I reviewed with the patient in detail post-care instructions. Patient should stay away from the sun and wear sun protection until treated areas are fully healed.
Dose Setting #3 (Mj/Cm2): 1000
Location #1: Scalp, trunk
Location #2: Knees, nails
Total Square Area In Cm2 (Required For Proper Billing): 456
Treatment Number: 73
Detail Level: Generalized
Dose Setting #2 (Mj/Cm2): 4717
Location #3: buttocks
Dose Setting #1 (Mj/Cm2): 4111
Total Energy In Joules: 989.65
Comments: Left scalp skipped due to resolving blisters.

## 2019-11-18 ENCOUNTER — APPOINTMENT (RX ONLY)
Age: 34
Setting detail: DERMATOLOGY
End: 2019-11-18

## 2019-11-18 DIAGNOSIS — L40.0 PSORIASIS VULGARIS: ICD-10-CM

## 2019-11-18 PROCEDURE — ? COUNSELING

## 2019-11-18 PROCEDURE — ? XTRAC LASER

## 2019-11-18 PROCEDURE — 99213 OFFICE O/P EST LOW 20 MIN: CPT | Mod: 25

## 2019-11-18 PROCEDURE — 96921 EXCIMER LSR PSRIASIS 250-500: CPT

## 2019-11-18 PROCEDURE — ? PRESCRIPTION

## 2019-11-18 PROCEDURE — ? ORDER TESTS

## 2019-11-18 RX ORDER — CLOBETASOL PROPIONATE 0.5 MG/ML
SOLUTION TOPICAL
Qty: 1 | Refills: 5 | Status: ERX

## 2019-11-18 RX ORDER — CALCIPOTRIENE 0.05 MG/ML
SOLUTION TOPICAL
Qty: 1 | Refills: 5 | Status: ERX | COMMUNITY
Start: 2019-11-18

## 2019-11-18 RX ORDER — KETOCONAZOLE 20 MG/ML
SHAMPOO TOPICAL
Qty: 1 | Refills: 5 | Status: ERX | COMMUNITY
Start: 2019-11-18

## 2019-11-18 RX ADMIN — CALCIPOTRIENE: 0.05 SOLUTION TOPICAL at 18:45

## 2019-11-18 RX ADMIN — KETOCONAZOLE: 20 SHAMPOO TOPICAL at 18:44

## 2019-11-18 ASSESSMENT — LOCATION ZONE DERM: LOCATION ZONE: TRUNK

## 2019-11-18 ASSESSMENT — PGA PSORIASIS: PGA PSORIASIS: MILD (MILD PLAQUE ELEVATION, LIGHT ERYTHEMA, FINE SCALE PREDOMINATES)

## 2019-11-18 ASSESSMENT — LOCATION DETAILED DESCRIPTION DERM: LOCATION DETAILED: RIGHT SUPERIOR MEDIAL UPPER BACK

## 2019-11-18 ASSESSMENT — LOCATION SIMPLE DESCRIPTION DERM: LOCATION SIMPLE: RIGHT UPPER BACK

## 2019-11-18 NOTE — PROCEDURE: XTRAC LASER
Detail Level: Generalized
Total Pulses (Will Not Render If 0): 0
Post-Care Instructions: I reviewed with the patient in detail post-care instructions. Patient should stay away from the sun and wear sun protection until treated areas are fully healed.
Dose Setting #3 (Mj/Cm2): 1000
Treatment Number: 74
Location #2: Knees, nails
Consent: see scanned.
Dose Setting #2 (Mj/Cm2): 1094
Location #1: Scalp, trunk
Dose Setting #1 (Mj/Cm2): 0334
Total Energy In Joules: 862.69
Total Square Area In Cm2 (Required For Proper Billing): 460
Location #3: buttocks
Comments: Left scalp skipped due to resolving blisters.

## 2019-11-18 NOTE — PROCEDURE: COUNSELING
Detail Level: Simple
Patient Specific Counseling (Will Not Stick From Patient To Patient): **We had a long discussion about his psoriasis-- he is actually happy with the Excimer laser as it (generally) clears his skin and keeps it clear for a long time; however, his scalp is very stubborn and he wonders what can be added to help, without going on systemic therapy (he was on Otezla in the past).  We decided to add Nizoral shampoo and dovonex to the scalp and check Vit D levels. Continue Excimer laser.

## 2019-12-05 ENCOUNTER — APPOINTMENT (RX ONLY)
Age: 34
Setting detail: DERMATOLOGY
End: 2019-12-05

## 2019-12-05 DIAGNOSIS — L40.0 PSORIASIS VULGARIS: ICD-10-CM

## 2019-12-05 PROCEDURE — 96922 EXCIMER LSR PSRIASIS>500SQCM: CPT

## 2019-12-05 PROCEDURE — ? XTRAC LASER

## 2019-12-05 ASSESSMENT — LOCATION DETAILED DESCRIPTION DERM: LOCATION DETAILED: RIGHT SUPERIOR MEDIAL UPPER BACK

## 2019-12-05 ASSESSMENT — LOCATION ZONE DERM: LOCATION ZONE: TRUNK

## 2019-12-05 ASSESSMENT — LOCATION SIMPLE DESCRIPTION DERM: LOCATION SIMPLE: RIGHT UPPER BACK

## 2019-12-05 NOTE — PROCEDURE: XTRAC LASER
Total Square Area In Cm2 (Required For Proper Billing): 495
Consent: see scanned.
Dose Setting #2 (Mj/Cm2): 6793
Treatment Number: 75
Location #1: Scalp, trunk
Detail Level: Generalized
Dose Setting #1 (Mj/Cm2): 900
Dose Setting #1 (Mj/Cm2): 2385
Dose Setting #3 (Mj/Cm2): 1000
Comments: Left scalp skipped due to resolving blisters.
Total Energy In Joules: 809.55
Total Pulses (Will Not Render If 0): 0
Location #2: Knees, nails
Location #3: buttocks
Total Square Area In Cm2 (Required For Proper Billing): 456
Post-Care Instructions: I reviewed with the patient in detail post-care instructions. Patient should stay away from the sun and wear sun protection until treated areas are fully healed.
Treatment Number: 73
Total Energy In Joules: 989.65

## 2019-12-12 ENCOUNTER — APPOINTMENT (RX ONLY)
Age: 34
Setting detail: DERMATOLOGY
End: 2019-12-12

## 2019-12-12 DIAGNOSIS — L40.0 PSORIASIS VULGARIS: ICD-10-CM

## 2019-12-12 PROCEDURE — 96921 EXCIMER LSR PSRIASIS 250-500: CPT

## 2019-12-12 PROCEDURE — ? XTRAC LASER

## 2019-12-12 ASSESSMENT — LOCATION ZONE DERM: LOCATION ZONE: TRUNK

## 2019-12-12 ASSESSMENT — LOCATION SIMPLE DESCRIPTION DERM: LOCATION SIMPLE: RIGHT UPPER BACK

## 2019-12-12 ASSESSMENT — LOCATION DETAILED DESCRIPTION DERM: LOCATION DETAILED: RIGHT SUPERIOR MEDIAL UPPER BACK

## 2019-12-12 NOTE — PROCEDURE: XTRAC LASER
Dose Setting #1 (Mj/Cm2): 900
Comments: Left scalp skipped due to resolving blisters.
Total Pulses (Will Not Render If 0): 0
Location #1: Scalp, trunk
Total Square Area In Cm2 (Required For Proper Billing): 260
Dose Setting #2 (Mj/Cm2): 7692
Total Energy In Joules: 795.04
Dose Setting #3 (Mj/Cm2): 1000
Detail Level: Generalized
Post-Care Instructions: I reviewed with the patient in detail post-care instructions. Patient should stay away from the sun and wear sun protection until treated areas are fully healed.
Location #2: Knees, nails
Location #3: buttocks
Treatment Number: 76
Consent: see scanned.

## 2019-12-16 ENCOUNTER — APPOINTMENT (RX ONLY)
Age: 34
Setting detail: DERMATOLOGY
End: 2019-12-16

## 2019-12-16 DIAGNOSIS — L40.0 PSORIASIS VULGARIS: ICD-10-CM

## 2019-12-16 PROCEDURE — ? XTRAC LASER

## 2019-12-16 PROCEDURE — 96921 EXCIMER LSR PSRIASIS 250-500: CPT

## 2019-12-16 ASSESSMENT — LOCATION ZONE DERM: LOCATION ZONE: TRUNK

## 2019-12-16 ASSESSMENT — LOCATION DETAILED DESCRIPTION DERM: LOCATION DETAILED: RIGHT SUPERIOR MEDIAL UPPER BACK

## 2019-12-16 ASSESSMENT — LOCATION SIMPLE DESCRIPTION DERM: LOCATION SIMPLE: RIGHT UPPER BACK

## 2019-12-16 NOTE — PROCEDURE: XTRAC LASER
Consent: see scanned.
Location #2: Knees, nails
Dose Setting #2 (Mj/Cm2): 3987
Comments: Left scalp skipped due to resolving blisters.
Dose Setting #3 (Mj/Cm2): 1000
Treatment Number: 77
Location #3: buttocks
Total Pulses (Will Not Render If 0): 0
Detail Level: Generalized
Dose Setting #1 (Mj/Cm2): 900
Total Energy In Joules: 793.15
Location #1: Scalp, trunk
Post-Care Instructions: I reviewed with the patient in detail post-care instructions. Patient should stay away from the sun and wear sun protection until treated areas are fully healed.
Total Square Area In Cm2 (Required For Proper Billing): 481

## 2019-12-19 ENCOUNTER — APPOINTMENT (RX ONLY)
Age: 34
Setting detail: DERMATOLOGY
End: 2019-12-19

## 2019-12-19 DIAGNOSIS — L40.0 PSORIASIS VULGARIS: ICD-10-CM

## 2019-12-19 PROCEDURE — ? XTRAC LASER

## 2019-12-19 PROCEDURE — 96921 EXCIMER LSR PSRIASIS 250-500: CPT

## 2019-12-19 ASSESSMENT — LOCATION SIMPLE DESCRIPTION DERM: LOCATION SIMPLE: RIGHT UPPER BACK

## 2019-12-19 ASSESSMENT — LOCATION DETAILED DESCRIPTION DERM: LOCATION DETAILED: RIGHT SUPERIOR MEDIAL UPPER BACK

## 2019-12-19 ASSESSMENT — LOCATION ZONE DERM: LOCATION ZONE: TRUNK

## 2019-12-19 NOTE — PROCEDURE: XTRAC LASER
Total Energy In Joules: 716.82
Consent: see scanned.
Dose Setting #3 (Mj/Cm2): 1000
Location #3: buttocks
Dose Setting #1 (Mj/Cm2): 900
Total Pulses (Will Not Render If 0): 0
Location #2: Knees, nails
Location #1: Scalp, trunk
Post-Care Instructions: I reviewed with the patient in detail post-care instructions. Patient should stay away from the sun and wear sun protection until treated areas are fully healed.
Total Square Area In Cm2 (Required For Proper Billing): 456
Detail Level: Generalized
Dose Setting #2 (Mj/Cm2): 5790
Comments: Left scalp skipped due to resolving blisters.
Treatment Number: 78

## 2019-12-31 ENCOUNTER — APPOINTMENT (RX ONLY)
Age: 34
Setting detail: DERMATOLOGY
End: 2019-12-31

## 2019-12-31 DIAGNOSIS — L40.0 PSORIASIS VULGARIS: ICD-10-CM

## 2019-12-31 PROCEDURE — 96921 EXCIMER LSR PSRIASIS 250-500: CPT

## 2019-12-31 PROCEDURE — ? XTRAC LASER

## 2019-12-31 ASSESSMENT — LOCATION SIMPLE DESCRIPTION DERM: LOCATION SIMPLE: RIGHT UPPER BACK

## 2019-12-31 ASSESSMENT — LOCATION DETAILED DESCRIPTION DERM: LOCATION DETAILED: RIGHT SUPERIOR MEDIAL UPPER BACK

## 2019-12-31 ASSESSMENT — LOCATION ZONE DERM: LOCATION ZONE: TRUNK

## 2019-12-31 NOTE — PROCEDURE: XTRAC LASER
Total Pulses (Will Not Render If 0): 0
Total Square Area In Cm2 (Required For Proper Billing): 440
Comments: Left scalp skipped due to resolving blisters.
Detail Level: Generalized
Consent: see scanned.
Location #2: Knees, nails
Treatment Number: 79
Dose Setting #1 (Mj/Cm2): 900
Location #3: buttocks
Dose Setting #2 (Mj/Cm2): 8505
Dose Setting #3 (Mj/Cm2): 1000
Location #1: Scalp, trunk
Post-Care Instructions: I reviewed with the patient in detail post-care instructions. Patient should stay away from the sun and wear sun protection until treated areas are fully healed.
Total Energy In Joules: 754.75

## 2020-01-02 ENCOUNTER — APPOINTMENT (RX ONLY)
Age: 35
Setting detail: DERMATOLOGY
End: 2020-01-02

## 2020-01-02 DIAGNOSIS — L40.0 PSORIASIS VULGARIS: ICD-10-CM

## 2020-01-02 PROCEDURE — 96921 EXCIMER LSR PSRIASIS 250-500: CPT

## 2020-01-02 PROCEDURE — ? XTRAC LASER

## 2020-01-02 ASSESSMENT — LOCATION DETAILED DESCRIPTION DERM: LOCATION DETAILED: RIGHT SUPERIOR MEDIAL UPPER BACK

## 2020-01-02 ASSESSMENT — LOCATION SIMPLE DESCRIPTION DERM: LOCATION SIMPLE: RIGHT UPPER BACK

## 2020-01-02 ASSESSMENT — LOCATION ZONE DERM: LOCATION ZONE: TRUNK

## 2020-01-02 NOTE — PROCEDURE: XTRAC LASER
Total Square Area In Cm2 (Required For Proper Billing): 460
Dose Setting #2 (Mj/Cm2): 8271
Location #1: Scalp, trunk
Detail Level: Generalized
Total Energy In Joules: 771.55
Location #3: buttocks
Comments: Left scalp skipped due to resolving blisters.
Location #2: Knees, nails
Total Pulses (Will Not Render If 0): 0
Treatment Number: 80
Dose Setting #3 (Mj/Cm2): 1000
Consent: see scanned.
Post-Care Instructions: I reviewed with the patient in detail post-care instructions. Patient should stay away from the sun and wear sun protection until treated areas are fully healed.
Dose Setting #1 (Mj/Cm2): 900

## 2020-01-06 ENCOUNTER — APPOINTMENT (RX ONLY)
Age: 35
Setting detail: DERMATOLOGY
End: 2020-01-06

## 2020-01-06 DIAGNOSIS — L40.0 PSORIASIS VULGARIS: ICD-10-CM

## 2020-01-06 PROCEDURE — ? XTRAC LASER

## 2020-01-06 PROCEDURE — 96921 EXCIMER LSR PSRIASIS 250-500: CPT

## 2020-01-06 ASSESSMENT — LOCATION ZONE DERM: LOCATION ZONE: TRUNK

## 2020-01-06 ASSESSMENT — LOCATION DETAILED DESCRIPTION DERM: LOCATION DETAILED: RIGHT SUPERIOR MEDIAL UPPER BACK

## 2020-01-06 ASSESSMENT — LOCATION SIMPLE DESCRIPTION DERM: LOCATION SIMPLE: RIGHT UPPER BACK

## 2020-01-06 NOTE — PROCEDURE: XTRAC LASER
Dose Setting #1 (Mj/Cm2): 900
Dose Setting #3 (Mj/Cm2): 1000
Total Pulses (Will Not Render If 0): 0
Dose Setting #2 (Mj/Cm2): 3686
Post-Care Instructions: I reviewed with the patient in detail post-care instructions. Patient should stay away from the sun and wear sun protection until treated areas are fully healed.
Treatment Number: 80
Detail Level: Generalized
Location #1: Scalp, trunk
Comments: Left scalp skipped due to resolving blisters.
Consent: see scanned.
Total Square Area In Cm2 (Required For Proper Billing): 456
Location #3: buttocks
Total Energy In Joules: 767.15
Location #2: Knees, nails

## 2020-01-09 ENCOUNTER — APPOINTMENT (RX ONLY)
Age: 35
Setting detail: DERMATOLOGY
End: 2020-01-09

## 2020-01-09 DIAGNOSIS — L40.0 PSORIASIS VULGARIS: ICD-10-CM

## 2020-01-09 PROCEDURE — ? XTRAC LASER

## 2020-01-09 PROCEDURE — 96922 EXCIMER LSR PSRIASIS>500SQCM: CPT

## 2020-01-09 ASSESSMENT — LOCATION SIMPLE DESCRIPTION DERM: LOCATION SIMPLE: RIGHT UPPER BACK

## 2020-01-09 ASSESSMENT — LOCATION DETAILED DESCRIPTION DERM: LOCATION DETAILED: RIGHT SUPERIOR MEDIAL UPPER BACK

## 2020-01-09 ASSESSMENT — LOCATION ZONE DERM: LOCATION ZONE: TRUNK

## 2020-01-09 NOTE — PROCEDURE: XTRAC LASER
Location #2: Knees, nails
Treatment Number: 82
Location #1: Scalp, trunk
Dose Setting #1 (Mj/Cm2): 900
Total Square Area In Cm2 (Required For Proper Billing): 537
Total Pulses (Will Not Render If 0): 0
Comments: Left scalp skipped due to resolving blisters.
Consent: see scanned.
Dose Setting #3 (Mj/Cm2): 1000
Total Energy In Joules: 838.35
Dose Setting #2 (Mj/Cm2): 5017
Post-Care Instructions: I reviewed with the patient in detail post-care instructions. Patient should stay away from the sun and wear sun protection until treated areas are fully healed.
Location #3: buttocks
Detail Level: Generalized

## 2020-01-13 ENCOUNTER — APPOINTMENT (RX ONLY)
Age: 35
Setting detail: DERMATOLOGY
End: 2020-01-13

## 2020-01-13 DIAGNOSIS — L40.0 PSORIASIS VULGARIS: ICD-10-CM

## 2020-01-13 PROCEDURE — ? XTRAC LASER

## 2020-01-13 PROCEDURE — 96921 EXCIMER LSR PSRIASIS 250-500: CPT

## 2020-01-13 ASSESSMENT — LOCATION ZONE DERM: LOCATION ZONE: TRUNK

## 2020-01-13 ASSESSMENT — LOCATION DETAILED DESCRIPTION DERM: LOCATION DETAILED: RIGHT SUPERIOR MEDIAL UPPER BACK

## 2020-01-13 ASSESSMENT — LOCATION SIMPLE DESCRIPTION DERM: LOCATION SIMPLE: RIGHT UPPER BACK

## 2020-01-13 NOTE — PROCEDURE: XTRAC LASER
Post-Care Instructions: I reviewed with the patient in detail post-care instructions. Patient should stay away from the sun and wear sun protection until treated areas are fully healed.
Total Pulses (Will Not Render If 0): 0
Location #2: Knees, nails
Total Energy In Joules: 828.54
Consent: see scanned.
Location #3: buttocks
Location #1: Scalp, trunk
Dose Setting #2 (Mj/Cm2): 427
Total Square Area In Cm2 (Required For Proper Billing): 466
Detail Level: Generalized
Comments: Left scalp skipped due to resolving blisters.
Dose Setting #1 (Mj/Cm2): 810
Dose Setting #3 (Mj/Cm2): 1050
Treatment Number: 83

## 2020-01-20 ENCOUNTER — APPOINTMENT (RX ONLY)
Age: 35
Setting detail: DERMATOLOGY
End: 2020-01-20

## 2020-01-20 DIAGNOSIS — L40.0 PSORIASIS VULGARIS: ICD-10-CM

## 2020-01-20 PROCEDURE — ? XTRAC LASER

## 2020-01-20 PROCEDURE — 96921 EXCIMER LSR PSRIASIS 250-500: CPT

## 2020-01-20 ASSESSMENT — LOCATION ZONE DERM: LOCATION ZONE: TRUNK

## 2020-01-20 ASSESSMENT — LOCATION DETAILED DESCRIPTION DERM: LOCATION DETAILED: RIGHT SUPERIOR MEDIAL UPPER BACK

## 2020-01-20 ASSESSMENT — LOCATION SIMPLE DESCRIPTION DERM: LOCATION SIMPLE: RIGHT UPPER BACK

## 2020-01-20 NOTE — PROCEDURE: XTRAC LASER
Comments: Left scalp skipped due to resolving blisters.
Total Pulses (Will Not Render If 0): 0
Dose Setting #1 (Mj/Cm2): 993
Location #3: buttocks
Post-Care Instructions: I reviewed with the patient in detail post-care instructions. Patient should stay away from the sun and wear sun protection until treated areas are fully healed.
Consent: see scanned.
Dose Setting #3 (Mj/Cm2): 1050
Treatment Number: 84
Total Square Area In Cm2 (Required For Proper Billing): 428
Dose Setting #2 (Mj/Cm2): 4979
Detail Level: Generalized
Location #2: Knees, nails
Location #1: Scalp, trunk
Total Energy In Joules: 880.20

## 2020-01-23 ENCOUNTER — APPOINTMENT (RX ONLY)
Age: 35
Setting detail: DERMATOLOGY
End: 2020-01-23

## 2020-01-23 DIAGNOSIS — L40.0 PSORIASIS VULGARIS: ICD-10-CM

## 2020-01-23 PROCEDURE — ? XTRAC LASER

## 2020-01-23 PROCEDURE — 96921 EXCIMER LSR PSRIASIS 250-500: CPT

## 2020-01-23 ASSESSMENT — LOCATION DETAILED DESCRIPTION DERM: LOCATION DETAILED: RIGHT SUPERIOR MEDIAL UPPER BACK

## 2020-01-23 ASSESSMENT — LOCATION ZONE DERM: LOCATION ZONE: TRUNK

## 2020-01-23 ASSESSMENT — LOCATION SIMPLE DESCRIPTION DERM: LOCATION SIMPLE: RIGHT UPPER BACK

## 2020-01-23 NOTE — PROCEDURE: XTRAC LASER
Consent: see scanned.
Dose Setting #1 (Mj/Cm2): 996
Post-Care Instructions: I reviewed with the patient in detail post-care instructions. Patient should stay away from the sun and wear sun protection until treated areas are fully healed.
Dose Setting #2 (Mj/Cm2): 4994
Treatment Number: 85
Total Energy In Joules: 836.93
Detail Level: Generalized
Dose Setting #3 (Mj/Cm2): 1050
Location #3: buttocks
Location #1: Scalp, trunk
Total Pulses (Will Not Render If 0): 0
Total Square Area In Cm2 (Required For Proper Billing): 416
Location #2: Knees, nails

## 2020-01-27 ENCOUNTER — APPOINTMENT (RX ONLY)
Age: 35
Setting detail: DERMATOLOGY
End: 2020-01-27

## 2020-01-27 DIAGNOSIS — L40.0 PSORIASIS VULGARIS: ICD-10-CM

## 2020-01-27 PROCEDURE — 96921 EXCIMER LSR PSRIASIS 250-500: CPT

## 2020-01-27 PROCEDURE — ? XTRAC LASER

## 2020-01-27 ASSESSMENT — LOCATION DETAILED DESCRIPTION DERM: LOCATION DETAILED: RIGHT SUPERIOR MEDIAL UPPER BACK

## 2020-01-27 ASSESSMENT — LOCATION ZONE DERM: LOCATION ZONE: TRUNK

## 2020-01-27 ASSESSMENT — LOCATION SIMPLE DESCRIPTION DERM: LOCATION SIMPLE: RIGHT UPPER BACK

## 2020-01-27 NOTE — PROCEDURE: XTRAC LASER
Location #2: Knees, nails
Dose Setting #3 (Mj/Cm2): 7699
Dose Setting #2 (Mj/Cm2): 5000
Total Energy In Joules: 968.11
Total Pulses (Will Not Render If 0): 0
Dose Setting #1 (Mj/Cm2): 1099
Detail Level: Generalized
Post-Care Instructions: I reviewed with the patient in detail post-care instructions. Patient should stay away from the sun and wear sun protection until treated areas are fully healed.
Treatment Number: 86
Location #3: buttocks
Total Square Area In Cm2 (Required For Proper Billing): 456
Location #1: Scalp, trunk
Consent: see scanned.

## 2020-02-06 ENCOUNTER — APPOINTMENT (RX ONLY)
Age: 35
Setting detail: DERMATOLOGY
End: 2020-02-06

## 2020-02-06 DIAGNOSIS — L40.0 PSORIASIS VULGARIS: ICD-10-CM

## 2020-02-06 PROCEDURE — ? XTRAC LASER

## 2020-02-06 PROCEDURE — 96921 EXCIMER LSR PSRIASIS 250-500: CPT

## 2020-02-06 ASSESSMENT — LOCATION SIMPLE DESCRIPTION DERM: LOCATION SIMPLE: RIGHT UPPER BACK

## 2020-02-06 ASSESSMENT — LOCATION DETAILED DESCRIPTION DERM: LOCATION DETAILED: RIGHT SUPERIOR MEDIAL UPPER BACK

## 2020-02-06 ASSESSMENT — LOCATION ZONE DERM: LOCATION ZONE: TRUNK

## 2020-02-06 NOTE — PROCEDURE: XTRAC LASER
Total Pulses (Will Not Render If 0): 0
Treatment Number: 87
Consent: see scanned.
Dose Setting #1 (Mj/Cm2): 1098
Location #2: Knees, nails
Detail Level: Generalized
Total Square Area In Cm2 (Required For Proper Billing): 440
Dose Setting #3 (Mj/Cm2): 9790
Post-Care Instructions: I reviewed with the patient in detail post-care instructions. Patient should stay away from the sun and wear sun protection until treated areas are fully healed.
Dose Setting #2 (Mj/Cm2): 5000
Location #1: Scalp, trunk
Total Energy In Joules: 892.22
Location #3: buttocks

## 2020-02-10 ENCOUNTER — APPOINTMENT (RX ONLY)
Age: 35
Setting detail: DERMATOLOGY
End: 2020-02-10

## 2020-02-10 DIAGNOSIS — L40.0 PSORIASIS VULGARIS: ICD-10-CM

## 2020-02-10 PROCEDURE — 96921 EXCIMER LSR PSRIASIS 250-500: CPT

## 2020-02-10 PROCEDURE — ? XTRAC LASER

## 2020-02-10 ASSESSMENT — LOCATION ZONE DERM: LOCATION ZONE: TRUNK

## 2020-02-10 ASSESSMENT — LOCATION SIMPLE DESCRIPTION DERM: LOCATION SIMPLE: RIGHT UPPER BACK

## 2020-02-10 ASSESSMENT — LOCATION DETAILED DESCRIPTION DERM: LOCATION DETAILED: RIGHT SUPERIOR MEDIAL UPPER BACK

## 2020-02-10 NOTE — PROCEDURE: XTRAC LASER
Consent: see scanned.
Location #2: Knees, nails
Dose Setting #3 (Mj/Cm2): 2439
Location #3: buttocks
Dose Setting #1 (Mj/Cm2): 1095
Detail Level: Generalized
Dose Setting #2 (Mj/Cm2): 5000
Total Square Area In Cm2 (Required For Proper Billing): 260
Post-Care Instructions: I reviewed with the patient in detail post-care instructions. Patient should stay away from the sun and wear sun protection until treated areas are fully healed.
Location #1: Scalp, trunk
Treatment Number: 88
Total Pulses (Will Not Render If 0): 0
Total Energy In Joules: 947.86

## 2020-02-13 ENCOUNTER — APPOINTMENT (RX ONLY)
Age: 35
Setting detail: DERMATOLOGY
End: 2020-02-13

## 2020-02-13 DIAGNOSIS — L40.0 PSORIASIS VULGARIS: ICD-10-CM

## 2020-02-13 PROCEDURE — ? XTRAC LASER

## 2020-02-13 PROCEDURE — 96921 EXCIMER LSR PSRIASIS 250-500: CPT

## 2020-02-13 ASSESSMENT — LOCATION DETAILED DESCRIPTION DERM: LOCATION DETAILED: RIGHT SUPERIOR MEDIAL UPPER BACK

## 2020-02-13 ASSESSMENT — LOCATION ZONE DERM: LOCATION ZONE: TRUNK

## 2020-02-13 ASSESSMENT — LOCATION SIMPLE DESCRIPTION DERM: LOCATION SIMPLE: RIGHT UPPER BACK

## 2020-02-13 NOTE — PROCEDURE: XTRAC LASER
Location #3: buttocks
Treatment Number: 89
Post-Care Instructions: I reviewed with the patient in detail post-care instructions. Patient should stay away from the sun and wear sun protection until treated areas are fully healed.
Detail Level: Generalized
Dose Setting #3 (Mj/Cm2): 1213
Location #1: Scalp, trunk
Consent: see scanned.
Total Square Area In Cm2 (Required For Proper Billing): 400
Location #2: Knees, nails
Total Pulses (Will Not Render If 0): 0
Total Energy In Joules: 937.64
Dose Setting #1 (Mj/Cm2): 9216
Dose Setting #2 (Mj/Cm2): 5000

## 2020-02-24 ENCOUNTER — APPOINTMENT (RX ONLY)
Age: 35
Setting detail: DERMATOLOGY
End: 2020-02-24

## 2020-02-24 DIAGNOSIS — L40.0 PSORIASIS VULGARIS: ICD-10-CM

## 2020-02-24 PROCEDURE — ? XTRAC LASER

## 2020-02-24 PROCEDURE — 96921 EXCIMER LSR PSRIASIS 250-500: CPT

## 2020-02-24 ASSESSMENT — LOCATION SIMPLE DESCRIPTION DERM: LOCATION SIMPLE: RIGHT UPPER BACK

## 2020-02-24 ASSESSMENT — LOCATION DETAILED DESCRIPTION DERM: LOCATION DETAILED: RIGHT SUPERIOR MEDIAL UPPER BACK

## 2020-02-24 ASSESSMENT — LOCATION ZONE DERM: LOCATION ZONE: TRUNK

## 2020-02-24 NOTE — PROCEDURE: XTRAC LASER
Dose Setting #2 (Mj/Cm2): 5000
Location #3: buttocks
Treatment Number: 90
Total Square Area In Cm2 (Required For Proper Billing): 440
Total Energy In Joules: 998.62
Post-Care Instructions: I reviewed with the patient in detail post-care instructions. Patient should stay away from the sun and wear sun protection until treated areas are fully healed.
Dose Setting #1 (Mj/Cm2): 0682
Location #2: Knees, nails
Dose Setting #3 (Mj/Cm2): 1213
Consent: see scanned.
Total Pulses (Will Not Render If 0): 0
Detail Level: Generalized
Location #1: Scalp, trunk

## 2020-02-27 ENCOUNTER — APPOINTMENT (RX ONLY)
Age: 35
Setting detail: DERMATOLOGY
End: 2020-02-27

## 2020-02-27 DIAGNOSIS — L40.0 PSORIASIS VULGARIS: ICD-10-CM

## 2020-02-27 PROCEDURE — ? XTRAC LASER

## 2020-02-27 PROCEDURE — 96921 EXCIMER LSR PSRIASIS 250-500: CPT

## 2020-02-27 ASSESSMENT — LOCATION ZONE DERM: LOCATION ZONE: TRUNK

## 2020-02-27 ASSESSMENT — LOCATION SIMPLE DESCRIPTION DERM: LOCATION SIMPLE: RIGHT UPPER BACK

## 2020-02-27 ASSESSMENT — LOCATION DETAILED DESCRIPTION DERM: LOCATION DETAILED: RIGHT SUPERIOR MEDIAL UPPER BACK

## 2020-02-27 NOTE — PROCEDURE: XTRAC LASER
Detail Level: Generalized
Dose Setting #1 (Mj/Cm2): 120
Location #2: Knees, nails
Total Square Area In Cm2 (Required For Proper Billing): 428
Dose Setting #2 (Mj/Cm2): 5000
Location #3: buttocks
Consent: see scanned.
Location #1: Scalp, trunk
Treatment Number: 91
Dose Setting #3 (Mj/Cm2): 9789
Total Pulses (Will Not Render If 0): 0
Post-Care Instructions: I reviewed with the patient in detail post-care instructions. Patient should stay away from the sun and wear sun protection until treated areas are fully healed.
Total Energy In Joules: 998.00

## 2020-03-02 ENCOUNTER — APPOINTMENT (RX ONLY)
Age: 35
Setting detail: DERMATOLOGY
End: 2020-03-02

## 2020-03-02 DIAGNOSIS — L40.0 PSORIASIS VULGARIS: ICD-10-CM

## 2020-03-02 DIAGNOSIS — B07.8 OTHER VIRAL WARTS: ICD-10-CM

## 2020-03-02 PROCEDURE — ? XTRAC LASER

## 2020-03-02 PROCEDURE — ? IMMUNOTHERAPY: CANDIDA ANTIGEN

## 2020-03-02 PROCEDURE — 96921 EXCIMER LSR PSRIASIS 250-500: CPT

## 2020-03-02 PROCEDURE — 11900 INJECT SKIN LESIONS </W 7: CPT

## 2020-03-02 ASSESSMENT — LOCATION SIMPLE DESCRIPTION DERM
LOCATION SIMPLE: RIGHT UPPER BACK
LOCATION SIMPLE: LEFT THUMB

## 2020-03-02 ASSESSMENT — LOCATION DETAILED DESCRIPTION DERM
LOCATION DETAILED: LEFT DISTAL ULNAR THUMB
LOCATION DETAILED: RIGHT SUPERIOR MEDIAL UPPER BACK

## 2020-03-02 ASSESSMENT — LOCATION ZONE DERM
LOCATION ZONE: TRUNK
LOCATION ZONE: FINGER

## 2020-03-02 NOTE — PROCEDURE: IMMUNOTHERAPY: CANDIDA ANTIGEN
Render Post-Care Instructions In Note?: no
Strength: N/A
Treatment #: 1
Detail Level: Detailed
Lot # (Optional): 
Consent was obtained from the patient. The risks of candida antigen injection were discussed in detail. Specifically, the risks of redness, itching, pain and allergic reactions were addressed. Prior to injection all of the patient's questions were answered.
Post-Care Instructions: I reviewed with the patient in detail post-care instructions. Mild to moderate itching, tenderness, or swelling at the injection site is common and usually lasts 24 to 48 hours. The patient may elevate the painful area, apply ice for 5 minutes at a time, take tylenol every 4 to 6 hours. Only 5% of Candida immunotherapy patients get flu-like symptoms. This usually lasts only 24 to 48 hours. It is possible to prevent this at future visits by taking tylenol before the injection. If warts are on the fingers, all rings should be removed for 2 days post treatment. The patient understands that multiple treatments may be needed and there is no gaurantee of improvement.
Expiration Date (Optional): 08/02/2021
Total Amount Injected In Ccs: 0.2
Topical Anesthesia?: 20% benzocaine, 8% lidocaine, 4% tetracaine

## 2020-03-02 NOTE — PROCEDURE: XTRAC LASER
Treatment Number: 92
Location #3: buttocks
Total Pulses (Will Not Render If 0): 0
Dose Setting #1 (Mj/Cm2): 120
Total Square Area In Cm2 (Required For Proper Billing): 356
Post-Care Instructions: I reviewed with the patient in detail post-care instructions. Patient should stay away from the sun and wear sun protection until treated areas are fully healed.
Dose Setting #3 (Mj/Cm2): 8289
Dose Setting #2 (Mj/Cm2): 5000
Detail Level: Generalized
Consent: see scanned.
Total Energy In Joules: 840.67
Location #1: Scalp, trunk
Location #2: Knees, nails

## 2020-03-02 NOTE — HPI: FREE FORM (FOLLOW UP HISTORY)
How Severe Is Your Skin Condition?: mild
What Brings You In Today For Follow Up? (This Is An Xx Year Old Patient Who Is Following Up For:): Wart
Where On Your Body Is It? (Located On:): Right thumb
What Was It Treated With? (The Condition Was Treated With:): Cryotherapy
Since The Treatment Is Your Condition Better, Worse, Or Unchanged? (Since The Treatment, The Condition Is:): Lesion has been treated with liquid nitrogen, and is recurrent.

## 2020-03-05 ENCOUNTER — APPOINTMENT (RX ONLY)
Age: 35
Setting detail: DERMATOLOGY
End: 2020-03-05

## 2020-03-05 DIAGNOSIS — L40.0 PSORIASIS VULGARIS: ICD-10-CM

## 2020-03-05 PROCEDURE — 96921 EXCIMER LSR PSRIASIS 250-500: CPT

## 2020-03-05 PROCEDURE — ? XTRAC LASER

## 2020-03-05 ASSESSMENT — LOCATION DETAILED DESCRIPTION DERM: LOCATION DETAILED: RIGHT SUPERIOR MEDIAL UPPER BACK

## 2020-03-05 ASSESSMENT — LOCATION ZONE DERM: LOCATION ZONE: TRUNK

## 2020-03-05 ASSESSMENT — LOCATION SIMPLE DESCRIPTION DERM: LOCATION SIMPLE: RIGHT UPPER BACK

## 2020-03-05 NOTE — PROCEDURE: XTRAC LASER
Post-Care Instructions: I reviewed with the patient in detail post-care instructions. Patient should stay away from the sun and wear sun protection until treated areas are fully healed.
Detail Level: Generalized
Location #1: Scalp, trunk
Dose Setting #1 (Mj/Cm2): 5856
Total Pulses (Will Not Render If 0): 0
Total Energy In Joules: 969.16
Dose Setting #2 (Mj/Cm2): 5000
Consent: see scanned.
Dose Setting #3 (Mj/Cm2): 1300
Location #3: buttocks
Location #2: Knees, nails
Treatment Number: 93
Total Square Area In Cm2 (Required For Proper Billing): 442

## 2020-03-09 ENCOUNTER — APPOINTMENT (RX ONLY)
Age: 35
Setting detail: DERMATOLOGY
End: 2020-03-09

## 2020-03-09 DIAGNOSIS — L40.0 PSORIASIS VULGARIS: ICD-10-CM

## 2020-03-09 PROCEDURE — 96921 EXCIMER LSR PSRIASIS 250-500: CPT

## 2020-03-09 PROCEDURE — ? XTRAC LASER

## 2020-03-09 ASSESSMENT — LOCATION SIMPLE DESCRIPTION DERM: LOCATION SIMPLE: RIGHT UPPER BACK

## 2020-03-09 ASSESSMENT — LOCATION ZONE DERM: LOCATION ZONE: TRUNK

## 2020-03-09 ASSESSMENT — LOCATION DETAILED DESCRIPTION DERM: LOCATION DETAILED: RIGHT SUPERIOR MEDIAL UPPER BACK

## 2020-03-09 NOTE — PROCEDURE: XTRAC LASER
Dose Setting #3 (Mj/Cm2): 1300
Location #2: Knees, nails
Post-Care Instructions: I reviewed with the patient in detail post-care instructions. Patient should stay away from the sun and wear sun protection until treated areas are fully healed.
Treatment Number: 94
Consent: see scanned.
Dose Setting #1 (Mj/Cm2): 0471
Dose Setting #2 (Mj/Cm2): 5000
Location #1: Scalp, trunk
Total Square Area In Cm2 (Required For Proper Billing): 396
Detail Level: Generalized
Location #3: buttocks
Total Energy In Joules: 870.18
Total Pulses (Will Not Render If 0): 0

## 2020-09-24 ENCOUNTER — APPOINTMENT (RX ONLY)
Age: 35
Setting detail: DERMATOLOGY
End: 2020-09-24

## 2020-09-24 ENCOUNTER — RX ONLY (OUTPATIENT)
Age: 35
Setting detail: RX ONLY
End: 2020-09-24

## 2020-09-24 VITALS — TEMPERATURE: 98.1 F

## 2020-09-24 DIAGNOSIS — L40.0 PSORIASIS VULGARIS: ICD-10-CM

## 2020-09-24 PROCEDURE — ? INTRALESIONAL KENALOG

## 2020-09-24 PROCEDURE — 11901 INJECT SKIN LESIONS >7: CPT

## 2020-09-24 PROCEDURE — ? PATIENT SPECIFIC COUNSELING

## 2020-09-24 RX ORDER — CALCIPOTRIENE 0.05 MG/ML
SOLUTION TOPICAL
Qty: 1 | Refills: 5 | Status: ERX

## 2020-09-24 RX ORDER — CLOBETASOL PROPIONATE 0.5 MG/ML
SOLUTION TOPICAL
Qty: 1 | Refills: 5 | Status: ERX

## 2020-09-24 RX ORDER — KETOCONAZOLE 20 MG/ML
SHAMPOO TOPICAL
Qty: 1 | Refills: 5 | Status: ERX

## 2020-09-24 RX ORDER — CALCIPOTRIENE AND BETAMETHASONE DIPROPIONATE 50; .5 UG/G; MG/G
OINTMENT TOPICAL
Qty: 2 | Refills: 5 | Status: ERX

## 2020-09-24 ASSESSMENT — LOCATION DETAILED DESCRIPTION DERM
LOCATION DETAILED: LEFT CENTRAL OCCIPITAL SCALP
LOCATION DETAILED: LEFT LATERAL FRONTAL SCALP
LOCATION DETAILED: LEFT KNEE
LOCATION DETAILED: RIGHT INFERIOR OCCIPITAL SCALP
LOCATION DETAILED: RIGHT KNEE
LOCATION DETAILED: RIGHT LATERAL FRONTAL SCALP
LOCATION DETAILED: LEFT SUPERIOR OCCIPITAL SCALP
LOCATION DETAILED: POSTERIOR MID-PARIETAL SCALP
LOCATION DETAILED: LEFT CENTRAL FRONTAL SCALP
LOCATION DETAILED: RIGHT SUPERIOR OCCIPITAL SCALP
LOCATION DETAILED: MID-OCCIPITAL SCALP
LOCATION DETAILED: RIGHT CENTRAL PARIETAL SCALP
LOCATION DETAILED: RIGHT INFERIOR PARIETAL SCALP
LOCATION DETAILED: LEFT CENTRAL PARIETAL SCALP

## 2020-09-24 ASSESSMENT — LOCATION ZONE DERM
LOCATION ZONE: SCALP
LOCATION ZONE: LEG

## 2020-09-24 ASSESSMENT — LOCATION SIMPLE DESCRIPTION DERM
LOCATION SIMPLE: SCALP
LOCATION SIMPLE: LEFT OCCIPITAL SCALP
LOCATION SIMPLE: RIGHT SCALP
LOCATION SIMPLE: RIGHT OCCIPITAL SCALP
LOCATION SIMPLE: RIGHT KNEE
LOCATION SIMPLE: LEFT KNEE
LOCATION SIMPLE: POSTERIOR SCALP
LOCATION SIMPLE: LEFT SCALP

## 2021-04-03 NOTE — PROCEDURE: INTRALESIONAL KENALOG
Administered By (Optional): Letha Triplett
X Size Of Lesion In Cm (Optional): 0
Consent: The risks of atrophy were reviewed with the patient.
Kenalog Preparation: Kenalog
Detail Level: Zone
Include Z78.9 (Other Specified Conditions Influencing Health Status) As An Associated Diagnosis?: No
Lot # For Kenalog (Optional): [id number]
Total Volume (Ccs): 2.0
Medical Necessity Clause: This procedure was medically necessary because the lesions that were treated were: hypertrophic scar
Concentration Of Kenalog Solution Injected (Mg/Ml): 2.5
02-Apr-2021 21:38

## 2021-12-10 NOTE — HPI: RASH (PSORIASIS)
How Severe Is Your Psoriasis?: moderate
ok
Do You Have A Family History Of Psoriasis?: yes
Is This A New Presentation, Or A Follow-Up?: Psoriasis